# Patient Record
Sex: FEMALE | Race: BLACK OR AFRICAN AMERICAN | Employment: UNEMPLOYED | URBAN - METROPOLITAN AREA
[De-identification: names, ages, dates, MRNs, and addresses within clinical notes are randomized per-mention and may not be internally consistent; named-entity substitution may affect disease eponyms.]

---

## 2020-03-12 ENCOUNTER — HOSPITAL ENCOUNTER (INPATIENT)
Age: 40
LOS: 6 days | Discharge: NURSING, REHAB OR LONG-TERM CARE | DRG: 383 | End: 2020-03-18
Attending: EMERGENCY MEDICINE | Admitting: INTERNAL MEDICINE
Payer: MEDICAID

## 2020-03-12 ENCOUNTER — APPOINTMENT (OUTPATIENT)
Dept: CT IMAGING | Age: 40
DRG: 383 | End: 2020-03-12
Attending: EMERGENCY MEDICINE
Payer: MEDICAID

## 2020-03-12 ENCOUNTER — APPOINTMENT (OUTPATIENT)
Dept: GENERAL RADIOLOGY | Age: 40
DRG: 383 | End: 2020-03-12
Attending: EMERGENCY MEDICINE
Payer: MEDICAID

## 2020-03-12 DIAGNOSIS — R09.02 HYPOXIA: ICD-10-CM

## 2020-03-12 DIAGNOSIS — K61.1 PERIRECTAL ABSCESS: Primary | ICD-10-CM

## 2020-03-12 PROBLEM — R73.9 HYPERGLYCEMIA: Status: ACTIVE | Noted: 2020-03-12

## 2020-03-12 PROBLEM — R53.1 WEAKNESS: Status: ACTIVE | Noted: 2020-03-12

## 2020-03-12 PROBLEM — K61.0 PERIANAL CELLULITIS: Status: ACTIVE | Noted: 2020-03-12

## 2020-03-12 LAB
ALBUMIN SERPL-MCNC: 2.3 G/DL (ref 3.5–5)
ALBUMIN/GLOB SERPL: 0.4 {RATIO} (ref 1.1–2.2)
ALP SERPL-CCNC: 101 U/L (ref 45–117)
ALT SERPL-CCNC: 25 U/L (ref 12–78)
AMPHET UR QL SCN: NEGATIVE
ANION GAP SERPL CALC-SCNC: 6 MMOL/L (ref 5–15)
APPEARANCE UR: CLEAR
AST SERPL-CCNC: 45 U/L (ref 15–37)
BACTERIA URNS QL MICRO: NEGATIVE /HPF
BARBITURATES UR QL SCN: NEGATIVE
BASOPHILS # BLD: 0 K/UL (ref 0–0.1)
BASOPHILS NFR BLD: 0 % (ref 0–1)
BENZODIAZ UR QL: NEGATIVE
BILIRUB SERPL-MCNC: 0.5 MG/DL (ref 0.2–1)
BILIRUB UR QL: NEGATIVE
BUN SERPL-MCNC: 10 MG/DL (ref 6–20)
BUN/CREAT SERPL: 11 (ref 12–20)
CALCIUM SERPL-MCNC: 8.5 MG/DL (ref 8.5–10.1)
CANNABINOIDS UR QL SCN: POSITIVE
CHLORIDE SERPL-SCNC: 96 MMOL/L (ref 97–108)
CO2 SERPL-SCNC: 28 MMOL/L (ref 21–32)
COCAINE UR QL SCN: NEGATIVE
COLOR UR: ABNORMAL
COMMENT, HOLDF: NORMAL
CREAT SERPL-MCNC: 0.93 MG/DL (ref 0.55–1.02)
DIFFERENTIAL METHOD BLD: ABNORMAL
DRUG SCRN COMMENT,DRGCM: ABNORMAL
EOSINOPHIL # BLD: 0.1 K/UL (ref 0–0.4)
EOSINOPHIL NFR BLD: 1 % (ref 0–7)
EPITH CASTS URNS QL MICRO: ABNORMAL /LPF
ERYTHROCYTE [DISTWIDTH] IN BLOOD BY AUTOMATED COUNT: 16.6 % (ref 11.5–14.5)
EST. AVERAGE GLUCOSE BLD GHB EST-MCNC: 275 MG/DL
GLOBULIN SER CALC-MCNC: 5.7 G/DL (ref 2–4)
GLUCOSE BLD STRIP.AUTO-MCNC: 154 MG/DL (ref 65–100)
GLUCOSE BLD STRIP.AUTO-MCNC: 166 MG/DL (ref 65–100)
GLUCOSE BLD STRIP.AUTO-MCNC: 377 MG/DL (ref 65–100)
GLUCOSE BLD STRIP.AUTO-MCNC: 395 MG/DL (ref 65–100)
GLUCOSE BLD STRIP.AUTO-MCNC: 408 MG/DL (ref 65–100)
GLUCOSE BLD STRIP.AUTO-MCNC: 414 MG/DL (ref 65–100)
GLUCOSE SERPL-MCNC: 393 MG/DL (ref 65–100)
GLUCOSE UR STRIP.AUTO-MCNC: >1000 MG/DL
HBA1C MFR BLD: 11.2 % (ref 4–5.6)
HCG UR QL: NEGATIVE
HCT VFR BLD AUTO: 31.9 % (ref 35–47)
HGB BLD-MCNC: 10.1 G/DL (ref 11.5–16)
HGB UR QL STRIP: ABNORMAL
HIV 1+2 AB+HIV1 P24 AG SERPL QL IA: NONREACTIVE
HIV12 RESULT COMMENT, HHIVC: NORMAL
IMM GRANULOCYTES # BLD AUTO: 0.1 K/UL (ref 0–0.04)
IMM GRANULOCYTES NFR BLD AUTO: 1 % (ref 0–0.5)
KETONES UR QL STRIP.AUTO: NEGATIVE MG/DL
LACTATE SERPL-SCNC: 1.4 MMOL/L (ref 0.4–2)
LEUKOCYTE ESTERASE UR QL STRIP.AUTO: NEGATIVE
LYMPHOCYTES # BLD: 2.7 K/UL (ref 0.8–3.5)
LYMPHOCYTES NFR BLD: 29 % (ref 12–49)
MAGNESIUM SERPL-MCNC: 2.7 MG/DL (ref 1.6–2.4)
MCH RBC QN AUTO: 26.1 PG (ref 26–34)
MCHC RBC AUTO-ENTMCNC: 31.7 G/DL (ref 30–36.5)
MCV RBC AUTO: 82.4 FL (ref 80–99)
METHADONE UR QL: NEGATIVE
MONOCYTES # BLD: 0.5 K/UL (ref 0–1)
MONOCYTES NFR BLD: 6 % (ref 5–13)
NEUTS SEG # BLD: 6.1 K/UL (ref 1.8–8)
NEUTS SEG NFR BLD: 63 % (ref 32–75)
NITRITE UR QL STRIP.AUTO: NEGATIVE
NRBC # BLD: 0 K/UL (ref 0–0.01)
NRBC BLD-RTO: 0 PER 100 WBC
OPIATES UR QL: NEGATIVE
PCP UR QL: NEGATIVE
PH UR STRIP: 6 [PH] (ref 5–8)
PHOSPHATE SERPL-MCNC: 2.9 MG/DL (ref 2.6–4.7)
PLATELET # BLD AUTO: 300 K/UL (ref 150–400)
PMV BLD AUTO: 11.4 FL (ref 8.9–12.9)
POTASSIUM SERPL-SCNC: 4.6 MMOL/L (ref 3.5–5.1)
PROT SERPL-MCNC: 8 G/DL (ref 6.4–8.2)
PROT UR STRIP-MCNC: ABNORMAL MG/DL
RBC # BLD AUTO: 3.87 M/UL (ref 3.8–5.2)
RBC #/AREA URNS HPF: >100 /HPF (ref 0–5)
SAMPLES BEING HELD,HOLD: NORMAL
SERVICE CMNT-IMP: ABNORMAL
SODIUM SERPL-SCNC: 130 MMOL/L (ref 136–145)
SP GR UR REFRACTOMETRY: <1.005
UR CULT HOLD, URHOLD: NORMAL
UROBILINOGEN UR QL STRIP.AUTO: 1 EU/DL (ref 0.2–1)
WBC # BLD AUTO: 9.5 K/UL (ref 3.6–11)
WBC URNS QL MICRO: ABNORMAL /HPF (ref 0–4)

## 2020-03-12 PROCEDURE — 74011000258 HC RX REV CODE- 258: Performed by: INTERNAL MEDICINE

## 2020-03-12 PROCEDURE — 83036 HEMOGLOBIN GLYCOSYLATED A1C: CPT

## 2020-03-12 PROCEDURE — 82962 GLUCOSE BLOOD TEST: CPT

## 2020-03-12 PROCEDURE — 96365 THER/PROPH/DIAG IV INF INIT: CPT

## 2020-03-12 PROCEDURE — 74011250636 HC RX REV CODE- 250/636: Performed by: INTERNAL MEDICINE

## 2020-03-12 PROCEDURE — 87389 HIV-1 AG W/HIV-1&-2 AB AG IA: CPT

## 2020-03-12 PROCEDURE — 99285 EMERGENCY DEPT VISIT HI MDM: CPT

## 2020-03-12 PROCEDURE — 74011636637 HC RX REV CODE- 636/637: Performed by: INTERNAL MEDICINE

## 2020-03-12 PROCEDURE — 36415 COLL VENOUS BLD VENIPUNCTURE: CPT

## 2020-03-12 PROCEDURE — 83735 ASSAY OF MAGNESIUM: CPT

## 2020-03-12 PROCEDURE — 74011000258 HC RX REV CODE- 258: Performed by: EMERGENCY MEDICINE

## 2020-03-12 PROCEDURE — 96361 HYDRATE IV INFUSION ADD-ON: CPT

## 2020-03-12 PROCEDURE — 81025 URINE PREGNANCY TEST: CPT

## 2020-03-12 PROCEDURE — 74177 CT ABD & PELVIS W/CONTRAST: CPT

## 2020-03-12 PROCEDURE — 74011250637 HC RX REV CODE- 250/637: Performed by: INTERNAL MEDICINE

## 2020-03-12 PROCEDURE — 65270000032 HC RM SEMIPRIVATE

## 2020-03-12 PROCEDURE — 85025 COMPLETE CBC W/AUTO DIFF WBC: CPT

## 2020-03-12 PROCEDURE — 74011000258 HC RX REV CODE- 258: Performed by: RADIOLOGY

## 2020-03-12 PROCEDURE — 74011250636 HC RX REV CODE- 250/636: Performed by: EMERGENCY MEDICINE

## 2020-03-12 PROCEDURE — 81001 URINALYSIS AUTO W/SCOPE: CPT

## 2020-03-12 PROCEDURE — 83605 ASSAY OF LACTIC ACID: CPT

## 2020-03-12 PROCEDURE — 80307 DRUG TEST PRSMV CHEM ANLYZR: CPT

## 2020-03-12 PROCEDURE — 74011636320 HC RX REV CODE- 636/320: Performed by: RADIOLOGY

## 2020-03-12 PROCEDURE — 70450 CT HEAD/BRAIN W/O DYE: CPT

## 2020-03-12 PROCEDURE — 87040 BLOOD CULTURE FOR BACTERIA: CPT

## 2020-03-12 PROCEDURE — 77030019905 HC CATH URETH INTMIT MDII -A

## 2020-03-12 PROCEDURE — 84100 ASSAY OF PHOSPHORUS: CPT

## 2020-03-12 PROCEDURE — 80053 COMPREHEN METABOLIC PANEL: CPT

## 2020-03-12 PROCEDURE — 71045 X-RAY EXAM CHEST 1 VIEW: CPT

## 2020-03-12 RX ORDER — IBUPROFEN 200 MG
1 TABLET ORAL EVERY 24 HOURS
Status: DISCONTINUED | OUTPATIENT
Start: 2020-03-12 | End: 2020-03-18 | Stop reason: HOSPADM

## 2020-03-12 RX ORDER — INSULIN LISPRO 100 [IU]/ML
INJECTION, SOLUTION INTRAVENOUS; SUBCUTANEOUS
Status: DISCONTINUED | OUTPATIENT
Start: 2020-03-12 | End: 2020-03-18 | Stop reason: HOSPADM

## 2020-03-12 RX ORDER — SODIUM CHLORIDE 0.9 % (FLUSH) 0.9 %
5-40 SYRINGE (ML) INJECTION EVERY 8 HOURS
Status: DISCONTINUED | OUTPATIENT
Start: 2020-03-12 | End: 2020-03-13

## 2020-03-12 RX ORDER — MAGNESIUM SULFATE 100 %
4 CRYSTALS MISCELLANEOUS AS NEEDED
Status: DISCONTINUED | OUTPATIENT
Start: 2020-03-12 | End: 2020-03-12 | Stop reason: SDUPTHER

## 2020-03-12 RX ORDER — SODIUM CHLORIDE 0.9 % (FLUSH) 0.9 %
10 SYRINGE (ML) INJECTION
Status: COMPLETED | OUTPATIENT
Start: 2020-03-12 | End: 2020-03-12

## 2020-03-12 RX ORDER — DEXTROSE MONOHYDRATE 100 MG/ML
0-250 INJECTION, SOLUTION INTRAVENOUS AS NEEDED
Status: DISCONTINUED | OUTPATIENT
Start: 2020-03-12 | End: 2020-03-12 | Stop reason: SDUPTHER

## 2020-03-12 RX ORDER — ENOXAPARIN SODIUM 100 MG/ML
40 INJECTION SUBCUTANEOUS EVERY 24 HOURS
Status: DISCONTINUED | OUTPATIENT
Start: 2020-03-12 | End: 2020-03-18 | Stop reason: HOSPADM

## 2020-03-12 RX ORDER — SODIUM CHLORIDE 9 MG/ML
125 INJECTION, SOLUTION INTRAVENOUS CONTINUOUS
Status: DISCONTINUED | OUTPATIENT
Start: 2020-03-12 | End: 2020-03-14

## 2020-03-12 RX ORDER — CITALOPRAM 20 MG/1
20 TABLET, FILM COATED ORAL DAILY
Status: ON HOLD | COMMUNITY
End: 2020-03-16 | Stop reason: SDUPTHER

## 2020-03-12 RX ORDER — SODIUM CHLORIDE 9 MG/ML
500 INJECTION, SOLUTION INTRAVENOUS CONTINUOUS
Status: DISPENSED | OUTPATIENT
Start: 2020-03-12 | End: 2020-03-12

## 2020-03-12 RX ORDER — ALPRAZOLAM 0.5 MG/1
0.5 TABLET ORAL
Status: ON HOLD | COMMUNITY
End: 2020-03-16 | Stop reason: SDUPTHER

## 2020-03-12 RX ORDER — INSULIN LISPRO 100 [IU]/ML
4 INJECTION, SOLUTION INTRAVENOUS; SUBCUTANEOUS
Status: DISCONTINUED | OUTPATIENT
Start: 2020-03-12 | End: 2020-03-18 | Stop reason: HOSPADM

## 2020-03-12 RX ORDER — ALPRAZOLAM 0.25 MG/1
0.25 TABLET ORAL 2 TIMES DAILY
Status: DISCONTINUED | OUTPATIENT
Start: 2020-03-12 | End: 2020-03-12

## 2020-03-12 RX ORDER — SACUBITRIL AND VALSARTAN 24; 26 MG/1; MG/1
1 TABLET, FILM COATED ORAL 2 TIMES DAILY
Status: ON HOLD | COMMUNITY
End: 2020-03-16 | Stop reason: SDUPTHER

## 2020-03-12 RX ORDER — MAGNESIUM SULFATE 100 %
4 CRYSTALS MISCELLANEOUS AS NEEDED
Status: DISCONTINUED | OUTPATIENT
Start: 2020-03-12 | End: 2020-03-18 | Stop reason: HOSPADM

## 2020-03-12 RX ORDER — SODIUM CHLORIDE 0.9 % (FLUSH) 0.9 %
5-40 SYRINGE (ML) INJECTION AS NEEDED
Status: DISCONTINUED | OUTPATIENT
Start: 2020-03-12 | End: 2020-03-18 | Stop reason: HOSPADM

## 2020-03-12 RX ORDER — IBUPROFEN 400 MG/1
400 TABLET ORAL
Status: DISCONTINUED | OUTPATIENT
Start: 2020-03-12 | End: 2020-03-18 | Stop reason: HOSPADM

## 2020-03-12 RX ORDER — INSULIN LISPRO 100 [IU]/ML
0.05 INJECTION, SOLUTION INTRAVENOUS; SUBCUTANEOUS
Status: DISCONTINUED | OUTPATIENT
Start: 2020-03-12 | End: 2020-03-12

## 2020-03-12 RX ORDER — ACETAMINOPHEN 325 MG/1
650 TABLET ORAL
Status: DISCONTINUED | OUTPATIENT
Start: 2020-03-12 | End: 2020-03-18 | Stop reason: HOSPADM

## 2020-03-12 RX ORDER — METOPROLOL SUCCINATE 50 MG/1
50 TABLET, EXTENDED RELEASE ORAL DAILY
Status: ON HOLD | COMMUNITY
End: 2020-03-16 | Stop reason: SDUPTHER

## 2020-03-12 RX ORDER — INSULIN LISPRO 100 [IU]/ML
8 INJECTION, SOLUTION INTRAVENOUS; SUBCUTANEOUS ONCE
Status: COMPLETED | OUTPATIENT
Start: 2020-03-12 | End: 2020-03-12

## 2020-03-12 RX ORDER — ONDANSETRON 2 MG/ML
4 INJECTION INTRAMUSCULAR; INTRAVENOUS
Status: DISCONTINUED | OUTPATIENT
Start: 2020-03-12 | End: 2020-03-18 | Stop reason: HOSPADM

## 2020-03-12 RX ORDER — INSULIN GLARGINE 100 [IU]/ML
0.2 INJECTION, SOLUTION SUBCUTANEOUS
Status: DISCONTINUED | OUTPATIENT
Start: 2020-03-12 | End: 2020-03-18 | Stop reason: HOSPADM

## 2020-03-12 RX ORDER — PANTOPRAZOLE SODIUM 40 MG/1
40 TABLET, DELAYED RELEASE ORAL
Status: DISCONTINUED | OUTPATIENT
Start: 2020-03-12 | End: 2020-03-12

## 2020-03-12 RX ORDER — INSULIN LISPRO 100 [IU]/ML
6 INJECTION, SOLUTION INTRAVENOUS; SUBCUTANEOUS ONCE
Status: COMPLETED | OUTPATIENT
Start: 2020-03-12 | End: 2020-03-12

## 2020-03-12 RX ORDER — ALPRAZOLAM 0.25 MG/1
0.25 TABLET ORAL 2 TIMES DAILY
Status: DISCONTINUED | OUTPATIENT
Start: 2020-03-12 | End: 2020-03-14

## 2020-03-12 RX ORDER — DEXTROSE MONOHYDRATE 100 MG/ML
0-250 INJECTION, SOLUTION INTRAVENOUS AS NEEDED
Status: DISCONTINUED | OUTPATIENT
Start: 2020-03-12 | End: 2020-03-18 | Stop reason: HOSPADM

## 2020-03-12 RX ORDER — ERGOCALCIFEROL 1.25 MG/1
50000 CAPSULE ORAL
Status: ON HOLD | COMMUNITY
End: 2020-03-16 | Stop reason: SDUPTHER

## 2020-03-12 RX ORDER — INSULIN LISPRO 100 [IU]/ML
INJECTION, SOLUTION INTRAVENOUS; SUBCUTANEOUS
Status: DISCONTINUED | OUTPATIENT
Start: 2020-03-12 | End: 2020-03-12

## 2020-03-12 RX ORDER — PANTOPRAZOLE SODIUM 40 MG/1
40 TABLET, DELAYED RELEASE ORAL
Status: DISCONTINUED | OUTPATIENT
Start: 2020-03-13 | End: 2020-03-18 | Stop reason: HOSPADM

## 2020-03-12 RX ORDER — SODIUM CHLORIDE 0.9 % (FLUSH) 0.9 %
5-40 SYRINGE (ML) INJECTION EVERY 8 HOURS
Status: DISCONTINUED | OUTPATIENT
Start: 2020-03-12 | End: 2020-03-18 | Stop reason: HOSPADM

## 2020-03-12 RX ADMIN — PIPERACILLIN AND TAZOBACTAM 3.38 G: 3; .375 INJECTION, POWDER, LYOPHILIZED, FOR SOLUTION INTRAVENOUS at 05:22

## 2020-03-12 RX ADMIN — SODIUM CHLORIDE 1000 ML: 900 INJECTION, SOLUTION INTRAVENOUS at 03:24

## 2020-03-12 RX ADMIN — INSULIN GLARGINE 19 UNITS: 100 INJECTION, SOLUTION SUBCUTANEOUS at 22:41

## 2020-03-12 RX ADMIN — Medication 10 ML: at 12:38

## 2020-03-12 RX ADMIN — INSULIN LISPRO 6 UNITS: 100 INJECTION, SOLUTION INTRAVENOUS; SUBCUTANEOUS at 06:38

## 2020-03-12 RX ADMIN — Medication 10 ML: at 04:26

## 2020-03-12 RX ADMIN — ALPRAZOLAM 0.25 MG: 0.25 TABLET ORAL at 15:25

## 2020-03-12 RX ADMIN — Medication 10 ML: at 22:45

## 2020-03-12 RX ADMIN — SODIUM CHLORIDE 100 ML: 900 INJECTION, SOLUTION INTRAVENOUS at 04:26

## 2020-03-12 RX ADMIN — INSULIN LISPRO 4 UNITS: 100 INJECTION, SOLUTION INTRAVENOUS; SUBCUTANEOUS at 14:13

## 2020-03-12 RX ADMIN — SODIUM CHLORIDE 3.38 G: 900 INJECTION, SOLUTION INTRAVENOUS at 22:41

## 2020-03-12 RX ADMIN — IBUPROFEN 400 MG: 400 TABLET, FILM COATED ORAL at 12:37

## 2020-03-12 RX ADMIN — INSULIN LISPRO 8 UNITS: 100 INJECTION, SOLUTION INTRAVENOUS; SUBCUTANEOUS at 14:12

## 2020-03-12 RX ADMIN — Medication 10 ML: at 15:25

## 2020-03-12 RX ADMIN — INSULIN LISPRO 2 UNITS: 100 INJECTION, SOLUTION INTRAVENOUS; SUBCUTANEOUS at 17:14

## 2020-03-12 RX ADMIN — ONDANSETRON 4 MG: 2 INJECTION, SOLUTION INTRAMUSCULAR; INTRAVENOUS at 13:14

## 2020-03-12 RX ADMIN — INSULIN LISPRO 4 UNITS: 100 INJECTION, SOLUTION INTRAVENOUS; SUBCUTANEOUS at 17:13

## 2020-03-12 RX ADMIN — SODIUM CHLORIDE 125 ML/HR: 900 INJECTION, SOLUTION INTRAVENOUS at 22:45

## 2020-03-12 RX ADMIN — ENOXAPARIN SODIUM 40 MG: 40 INJECTION SUBCUTANEOUS at 09:03

## 2020-03-12 RX ADMIN — SODIUM CHLORIDE 125 ML/HR: 900 INJECTION, SOLUTION INTRAVENOUS at 09:16

## 2020-03-12 RX ADMIN — SODIUM CHLORIDE 1000 ML: 900 INJECTION, SOLUTION INTRAVENOUS at 05:38

## 2020-03-12 RX ADMIN — PANTOPRAZOLE SODIUM 40 MG: 40 TABLET, DELAYED RELEASE ORAL at 12:37

## 2020-03-12 RX ADMIN — IOPAMIDOL 100 ML: 755 INJECTION, SOLUTION INTRAVENOUS at 04:26

## 2020-03-12 RX ADMIN — SODIUM CHLORIDE 3.38 G: 900 INJECTION, SOLUTION INTRAVENOUS at 12:37

## 2020-03-12 RX ADMIN — IBUPROFEN 400 MG: 400 TABLET, FILM COATED ORAL at 18:45

## 2020-03-12 RX ADMIN — HUMAN INSULIN 15 UNITS: 100 INJECTION, SUSPENSION SUBCUTANEOUS at 09:02

## 2020-03-12 NOTE — ED TRIAGE NOTES
[facility] after being found wandering around the shelter. Pt reported feeling tired and weak. Was able to ambulate from stretcher to ED stretcher. Pt lethargic with slurred speech during triage     Was seen at [facility] for an abscess on buttocks yesterday. Had an elevated BG at [facility] of 507 yesterday as well and, per pt, was treated with insulin.          Hx of DM

## 2020-03-12 NOTE — PROGRESS NOTES
Admission Medication Reconciliation:    Information obtained from:  [address]:  YES    Comments/Recommendations: Updated PTA meds/reviewed patient's allergies. 1)  Added all of the medications listed below. No medications present prior to review. ¹[employer] pharmacy benefit data reflects medications filled and processed through the patient's insurance, however   this data does NOT capture whether the medication was picked up or is currently being taken by the patient. Allergies:  Patient has no known allergies. Significant PMH/Disease States:   Past Medical History:   Diagnosis Date    Diabetes [facility])      Chief Complaint for this Admission:    Chief Complaint   Patient presents with    Blood sugar problem    Lethargy     Prior to Admission Medications:   Prior to Admission Medications   Prescriptions Last Dose Informant Taking? ALPRAZolam (XANAX) 0.5 mg tablet   Yes   Sig: Take 0.5 mg by mouth three (3) times daily as needed for Anxiety. SITagliptin-metFORMIN (JANUMET) 50-1,000 mg per tablet   Yes   Sig: Take 1 Tab by mouth two (2) times daily (with meals). citalopram (CELEXA) 20 mg tablet   Yes   Sig: Take 20 mg by mouth daily. ergocalciferol (ERGOCALCIFEROL) 1,250 mcg (50,000 unit) capsule   Yes   Sig: Take 50,000 Units by mouth every seven (7) days. metoprolol succinate (TOPROL-XL) 50 mg XL tablet   Yes   Sig: Take 50 mg by mouth daily. sacubitriL-valsartan (Entresto) 24-26 mg tablet   Yes   Sig: Take 1 Tab by mouth two (2) times a day. Facility-Administered Medications: None       Please contact the main inpatient pharmacy with any questions or concerns at [phone] and we will direct you to the clinical pharmacist covering this patient's care while in-house.    Evelyne Schaeffer

## 2020-03-12 NOTE — PROGRESS NOTES
Bedside shift change report given to Rosalina Kate (oncoming nurse) by Nathan (offgoing nurse). Report included the following information SBAR, Kardex, MAR and Recent Results.

## 2020-03-12 NOTE — WOUND CARE
WOCN Note:  
 
New consult placed for assessment of right and left buttock abscess sites that were I&Ded at [facility] recently. Patient was discharged from [facility] on 3.11.2020. Chart reviewed. Admitted DX:  Hypoxia; Perianal cellulitis; Hyperglycemia; Weakness Past Medical History:  DM; legally blind in Right eye; homeless Assessment:  
Patient is A&O x 3, communicative and mobile independently in bed. Bed: aftab Patient wearing briefs for incontinence/menses. Patient reports pain to the buttocks. Heels intact without erythema. Sacrum intact without erythema. 1. POA Left buttock, s/p I&D abscess site: 0.5 x 2 x 4.5 cm;  Unable to see depth of wound; there is yellow slough coming out of wound; moderate amount of purulent drainage; no odor; induration and red tenderness to the periwound. 2.  POA Right buttock, s/p I&D abscess site:  0.7 x 1.3 x 3.5 cm; unable to see depth of wound; the area that is visible is pink; small purulent drainage; no odor; induration and red tenderness to the periwound. Wound, Pressure Prevention & Skin Care Recommendations: 1. Minimize layers of linen/pads under patient to optimize support surface. 2.  Turn/reposition approximately every 2 hours and offload heels. 3.   Right and left buttocks:  Pack with 1 continuous length of half width [place]; ensure that an ample amount is outside of the wound; cover with dry gauze and secure with tape. Discussed above plan with patient, Dr Chau Albert and Veronique Albright RN. Transition of Care: Plan to follow as needed while admitted to hospital.  Meeting Dr Chau Albert tomorrow to see patient and reassess wound. WING [place] RN [facility] Inpatient Wound Care Available on [facility] Pager [phone] Office [phone]

## 2020-03-12 NOTE — PROGRESS NOTES
0830 TRANSFER - IN REPORT:    Verbal report received from trenton(name) on Anna Morales  being received from ED(unit) for routine progression of care      Report consisted of patients Situation, Background, Assessment and   Recommendations(SBAR). Information from the following report(s) SBAR, Kardex and MAR was reviewed with the receiving nurse. Opportunity for questions and clarification was provided. Assessment completed upon patients arrival to unit and care assumed. Pt appears to be under the influence, MD noted in ER    1300 Pt attempted to being given xanex from home by male visitor.  Retrieved, now lock in drawer

## 2020-03-12 NOTE — PROGRESS NOTES
Problem: Diabetes Self-Management  Goal: *Disease process and treatment process  Description: Define diabetes and identify own type of diabetes; list 3 options for treating diabetes. Outcome: Progressing Towards Goal  Goal: *Incorporating nutritional management into lifestyle  Description: Describe effect of type, amount and timing of food on blood glucose; list 3 methods for planning meals. Outcome: Not Progressing Towards Goal  Goal: *Incorporating physical activity into lifestyle  Description: State effect of exercise on blood glucose levels. Outcome: Not Progressing Towards Goal  Goal: *Developing strategies to promote health/change behavior  Description: Define the ABC's of diabetes; identify appropriate screenings, schedule and personal plan for screenings. Outcome: Not Progressing Towards Goal  Goal: *Using medications safely  Description: State effect of diabetes medications on diabetes; name diabetes medication taking, action and side effects. Outcome: Not Progressing Towards Goal  Goal: *Monitoring blood glucose, interpreting and using results  Description: Identify recommended blood glucose targets  and personal targets. Outcome: Progressing Towards Goal  Goal: *Prevention, detection, treatment of acute complications  Description: List symptoms of hyper- and hypoglycemia; describe how to treat low blood sugar and actions for lowering  high blood glucose level. Outcome: Not Progressing Towards Goal  Goal: *Prevention, detection and treatment of chronic complications  Description: Define the natural course of diabetes and describe the relationship of blood glucose levels to long term complications of diabetes.   Outcome: Not Progressing Towards Goal  Goal: *Developing strategies to address psychosocial issues  Description: Describe feelings about living with diabetes; identify support needed and support network  Outcome: Not Progressing Towards Goal     Problem: Falls - Risk of  Goal: *Absence of Falls  Description: Document Dilshad Walter Fall Risk and appropriate interventions in the flowsheet.   Outcome: Progressing Towards Goal  Note: Fall Risk Interventions:  Mobility Interventions: Patient to call before getting OOB, PT Consult for assist device competence, Bed/chair exit alarm    Mentation Interventions: Adequate sleep, hydration, pain control, Bed/chair exit alarm    Medication Interventions: Bed/chair exit alarm, Patient to call before getting OOB    Elimination Interventions: Call light in reach, Bed/chair exit alarm, Toileting schedule/hourly rounds

## 2020-03-12 NOTE — CONSULTS
[facility]  CLINICAL NURSE SPECIALIST CONSULT  PROGRAM FOR DIABETES HEALTH    Presentation   Eli Alfreod is a 44 y.o. female admitted with perirectal abscess and hyperglycemia. Current clinical course has been uncomplicated. Patient has known type two diabetes. Consulted by Provider for advanced diabetes nursing assessment and care, specifically related to     [x] Inpatient management strategy  [x] Home management assessment      Diabetes-related medical history  Acute complications: Hyperglycemia    Neurological complications  Peripheral neuropathy     Microvascular disease: None    Macrovascular disease  Foot wounds     Other associated conditions     Depression    Diabetes medication history  Drug class Currently in use Discontinued Never used   Biguanide      DDP-4 inhibitor       Sulfonylurea      Thiazolidinedione      GLP-1 RA      SGLT-2 inhibitors      Basal insulin      Bolus insulin        Subjective   Ms Diogenes Crespo is a 44year old female with a history of uncontrolled type two diabetes diabetes who presented to the ED last night for c/o weakness, lethargy and incontinence. Of note, she was discharged from [facility] one day prior after I&D for perineal abscesses, incisions on both buttocks, draining pus. In the [facility] ED, she was noted to have an altered mental status with a blood glucose in the 400s and an A1C of 11.2%. She reports that she was evicted about 6 months ago and has been living in a [facility]. Discussion of diabetes management was difficult as she continues to have an altered mental status and nods off in the middle of conversation. She did report that she gets metformin for free and was given two Toujeo pens on discharge home.     Social determinants of health impacting diabetes self-management practices   Worried that housing situation is unstable, Worried that your food supply will run out before you have money to buy more, Missing health appointments or obtaining medications due to lack of reliable transportation and Struggling with anxiety and/or depression    Objective   Physical exam  General Altered mental status, falling asleep during exam.  Vital Signs   Visit Vitals  /70 (BP 1 Location: Right arm, BP Patient Position: At rest)   Pulse 93   Temp 96.9 °F (36.1 °C)   Resp 14   Ht 5' 6\" (1.676 m)   Wt 93.7 kg (206 lb 9.1 oz)   SpO2 97%   BMI 33.34 kg/m²   . Skin  Warm and dry. Acanthosis noted along neckline. No lipohypertrophy or lipoatrophy noted at injection sites   Heart   Regular rate and rhythm. No murmurs, rubs or gallops  Lungs  Clear without rales or rhonchi  Extremities Diabetic foot exam:    Left Foot     Visual Exam: normal    Pulse DP: 2+ (normal)   Vibratory and Filament test: deferred due to clinical condition. Patient falling asleep during exam    Right Foot   Visual Exam: normal    Pulse DP: 2+ (normal)   Vibratory and Filament test: deferred due to clinical condition. Falling asleep      Laboratory  Lab Results   Component Value Date/Time    Hemoglobin A1c 11.2 (H) 03/12/2020 06:39 AM     No results found for: LDL, LDLC, DLDLP  Lab Results   Component Value Date/Time    Creatinine 0.93 03/12/2020 03:05 AM     Lab Results   Component Value Date/Time    Sodium 130 (L) 03/12/2020 03:05 AM    Potassium 4.6 03/12/2020 03:05 AM    Chloride 96 (L) 03/12/2020 03:05 AM    CO2 28 03/12/2020 03:05 AM    Anion gap 6 03/12/2020 03:05 AM    Glucose 393 (H) 03/12/2020 03:05 AM    BUN 10 03/12/2020 03:05 AM    Creatinine 0.93 03/12/2020 03:05 AM    BUN/Creatinine ratio 11 (L) 03/12/2020 03:05 AM    GFR est AA >60 03/12/2020 03:05 AM    GFR est non-AA >60 03/12/2020 03:05 AM    Calcium 8.5 03/12/2020 03:05 AM    Bilirubin, total 0.5 03/12/2020 03:05 AM    AST (SGOT) 45 (H) 03/12/2020 03:05 AM    Alk.  phosphatase 101 03/12/2020 03:05 AM    Protein, total 8.0 03/12/2020 03:05 AM    Albumin 2.3 (L) 03/12/2020 03:05 AM    Globulin 5.7 (H) 03/12/2020 03:05 AM    A-G Ratio 0.4 (L) 03/12/2020 03:05 AM    ALT (SGPT) 25 03/12/2020 03:05 AM     Lab Results   Component Value Date/Time    ALT (SGPT) 25 03/12/2020 03:05 AM       Blood glucose pattern  -414      Assessment and Plan   Nursing Diagnosis Risk for unstable blood glucose pattern   Nursing Intervention Domain 2284 Decision-making Support   Nursing Interventions Examined current inpatient diabetes control   Explored factors facilitating and impeding inpatient management  Identified self-management practices impeding diabetes control  Explored corrective strategies with patient and responsible inpatient provider   Informed patient of rational for insulin strategy while hospitalized     Evaluation   Ms Nora Callaway is a 44year old female with a history of uncontrolled type 1 diabetes who presented to the ED with a new c/o weakness, lethargy and incontinence. She was discharged 1 day ago from [facility] after an I&D of bilateral perirectal abcess with initiation of PO antibiotics. Blood glucose elevated on admission to 414. She reports an unstable living situation and inconsistency in medication administration. She verbalizes that she has access to metformin but I suspect that she doesn't take this consistently. She has a prescription for insulin but does not have a consistent refrigerator to keep unused insulin in. Insulin to be given in the inpatient setting to address hyperglycemia. Case management and team to come up with a plan for diabetes management. Insulin may not be the best choice as patient has proven that she doesn't have appropriate resources for storage at this time. Recommendations   Recommend:  Initiate the subcutaneous insulin orderset:  1. Low Dose Lantus: 20 units Daily; first dose this evening  Daily evaluation fasting blood glucose will be required to determine the appropriate amount of Lantus.  If fasting blood glucose is over 200, please add the total amount of correctional Humalog received the day before to total Lantus dose. Ok to split Lantus BID for larger doses. 2. Low dose bolus with meals: (0.05 units/kg): 5 units Humalog with meals  If pre-prandial glucose remains over 200, please adjust Humalog to 8 units with meals. 3. Correctional insulin for obese/resistant patients    4. Diabetic Diet    5. Case Management     On discharge: Continue metformin 1000 mg BID  While insulin is appropriate for her diabetes; she does not have resources to properly store. I also am not confident in her mental capability and skill to administer.   Please consider consider switching to a cost effective pill such as Amaryl 2 mg Daily           Billing Code(s)     [x] 10792 IP subsequent hospital care - 39 minutes      ROSHNI Boyer  Access via RAINA [place]ales [phone]

## 2020-03-12 NOTE — ED PROVIDER NOTES
HPI     66-year-old female who apparently is homeless, insulin-dependent diabetes, Behcet's disease, asthma, presents the emergency department with generalized weakness. She states she could not get up to go to the bathroom tonight and urinated on herself. She states she felt off balance. She denies a headache or hitting her head, or loss of consciousness. She denies any focal weakness or numbness. She states she was at [facility] yesterday for perirectal abscesses which were drained. She is on antibiotics but she is not sure which. She also reports a cough in the past couple of days. She denies a fever, chest pain, shortness of breath or abdominal pain, nausea, vomiting, or diarrhea. She states she is urinating normally. She has not had increased thirst.  She does smoke. Denies alcohol or substance abuse. Past Medical History:   Diagnosis Date    Diabetes [facility])        History reviewed. No pertinent surgical history. History reviewed. No pertinent family history.     Social History     Socioeconomic History    Marital status: Not on file     Spouse name: Not on file    Number of children: Not on file    Years of education: Not on file    Highest education level: Not on file   Occupational History    Not on file   Social Needs    Financial resource strain: Not on file    Food insecurity     Worry: Not on file     Inability: Not on file    Transportation needs     Medical: Not on file     Non-medical: Not on file   Tobacco Use    Smoking status: Current Every Day Smoker     Packs/day: 1.00    Smokeless tobacco: Never Used   Substance and Sexual Activity    Alcohol use: Never     Frequency: Never    Drug use: Never    Sexual activity: Not on file   Lifestyle    Physical activity     Days per week: Not on file     Minutes per session: Not on file    Stress: Not on file   Relationships    Social connections     Talks on phone: Not on file     Gets together: Not on file     Attends Judaism service: Not on file     Active member of club or organization: Not on file     Attends meetings of clubs or organizations: Not on file     Relationship status: Not on file    Intimate partner violence     Fear of current or ex partner: Not on file     Emotionally abused: Not on file     Physically abused: Not on file     Forced sexual activity: Not on file   Other Topics Concern    Not on file   Social History Narrative    Not on file         ALLERGIES: Patient has no known allergies. Review of Systems   Constitutional: Negative for fever. HENT: Positive for congestion. Eyes: Negative for visual disturbance. Respiratory: Positive for cough. Negative for chest tightness and shortness of breath. Cardiovascular: Negative for chest pain, palpitations and leg swelling. Gastrointestinal: Negative for abdominal pain, nausea and vomiting. Musculoskeletal: Positive for gait problem. Skin: Negative for rash. Neurological: Positive for light-headedness. Negative for headaches. Psychiatric/Behavioral: Negative for dysphoric mood. Vitals:    03/12/20 0253 03/12/20 0302 03/12/20 0306   BP: 128/78     Pulse: (!) 114 (!) 111    Resp: 16 13    Temp: 99.4 °F (37.4 °C)     SpO2: 93% (!) 88% 93%   Weight: 93.7 kg (206 lb 9.1 oz)     Height: 5' 6\" (1.676 m)              Physical Exam  Constitutional:       General: She is not in acute distress. Appearance: She is well-developed. She is ill-appearing. HENT:      Head: Normocephalic and atraumatic. Mouth/Throat:      Pharynx: No oropharyngeal exudate. Eyes:      General: No scleral icterus. Right eye: No discharge. Left eye: No discharge. Pupils: Pupils are equal, round, and reactive to light. Neck:      Musculoskeletal: Normal range of motion and neck supple. Vascular: No JVD. Cardiovascular:      Rate and Rhythm: Regular rhythm. Tachycardia present. Heart sounds: Normal heart sounds. No murmur.    Pulmonary: Effort: Pulmonary effort is normal. No respiratory distress. Breath sounds: Normal breath sounds. No stridor. No wheezing or rales. Chest:      Chest wall: No tenderness. Abdominal:      General: Bowel sounds are normal. There is no distension. Palpations: Abdomen is soft. There is no mass. Tenderness: There is no abdominal tenderness. There is no guarding or rebound. Musculoskeletal: Normal range of motion. Skin:     General: Skin is warm and dry. Capillary Refill: Capillary refill takes less than 2 seconds. Findings: No rash. Comments: 2 draining incisions bilateral buttocks. Swelling and induration on right. Neurological:      Mental Status: She is oriented to person, place, and time. Psychiatric:         Behavior: Behavior normal.         Thought Content: Thought content normal.         Judgment: Judgment normal.          MDM       Procedures      Labs ok except for elevated glucose. Homeless IDDM with perineal celluliits, abscesses drainage at [facility] yesterday. Presents with generalized weakness, hypoxic with cough (neg cxr) wbc/lactate ok, received zosyn. A lot of drainage from wounds. Due to social situation and IDDM, do not think she will do well outpatient as attempted by [facility]. WIll admit. [facility] for Admission  5:41 AM      ED Room Number: GD97/09  Patient Name and age:  Michelle Degree 44 y.o.  female  Working Diagnosis:   1. Perirectal abscess    2. IDDM (insulin dependent diabetes mellitus) ([facility] [address]    3. Hypoxia      Readmission: no  Isolation Requirements:  no  Recommended Level of Care:  telemetry  Code Status:  Full Code  Department:[facility] Adult ED - [phone]  Other:  44year old female with IDDM, homeless, seen at [facility] yesterday for perirectal abscesses that were drained. D/C with oral antibiotics. Presented this morning with weakness. Presented tachycardia and hypoxic. Cough but no chest pain, no fever. WBC/lacatate ok.  Sugar in 400's. CXR clear. Head ct neg. Abd/pelvis CT c/w cellulitis. She received zosyn. Wounds are draining large amount of pus. Tachycardia improving with fluids.

## 2020-03-12 NOTE — H&P
[facility] Adult  Hospitalist Group  History and Physical    Primary Care Provider: None  Date of Service:  3/12/2020    Subjective:     Lb Zhang is a 44 y.o. female with known hx of DM1, discharged from [facility] on 3/11/20 after I&D for perineal abscesses, incisions on both buttocks, draining pus. Pt is homeless and was discharge to [facility] [address] [facility]. Pt appears sleepy, dozzing off during the conversation, seems like under the influence. Pt stated to use marijuana. Pt felt very weak and unsteady and could not get up and urinated on self, so called 911 for the help and was brought to the ED. Pt denied any fever, chills, abdominal pain, chest pain, SOB, palpitation, dizziness, lightheadedness, nausea, vomiting. Pt is legally blind in R eye. No visible defects noticeable. No other concerns. History was little limited due to patient condition. Review of Systems:    A comprehensive review of systems was negative except for that written in the History of Present Illness. Past Medical History:   Diagnosis Date    Diabetes [facility])       History reviewed. No pertinent surgical history. Prior to Admission medications    Not on File     No Known Allergies   History reviewed. No pertinent family history. SOCIAL HISTORY:  Patient resides at [address] [place] [address]. Patient ambulates independently. Smoking history: 1PPD for more than 10-12 years  Alcohol history: occasional      Objective:       Physical Exam:   General:          Alert, cooperative, no distress, appears stated age. Drowsy and sleepy intermittently but   easily arousable    HEENT:           Atraumatic, anicteric sclerae, pink conjunctivae                          No oral ulcers, mucosa moist, throat clear, dentition fair  Neck:               Supple, symmetrical  Lungs:             Clear to auscultation bilaterally. No Wheezing or Rhonchi. No rales. Chest wall:      No tenderness  No Accessory muscle use.   Heart: Regular  rhythm,  No  murmur   No edema  Abdomen:        Soft, non-tender. Not distended. Bowel sounds normal. Perineal/ buttock dressing in   Place. Exam deferred by pt. Actively menstruating  Extremities:     No cyanosis. No clubbing,                            Skin turgor normal, Capillary refill normal  Skin:                Not pale. Not Jaundiced  No rashes   Psych:             Not anxious or agitated. Neurologic:      Alert, moves all extremities, answers questions appropriately and responds to commands     Cap refill: Brisk, less than 3 seconds  Pulses: 2+, symmetric in all extremities    Data Review: All diagnostic labs and studies have been reviewed. Ct Head Wo Cont    Result Date: 3/12/2020  IMPRESSION: Unremarkable CT of the head. Ct Abd Pelv W Cont    Result Date: 3/12/2020  IMPRESSION: Perineal cellulitis without evidence of abscess. Xr Chest Port    Result Date: 3/12/2020  IMPRESSION: Normal chest.    Recent Results (from the past 24 hour(s))   GLUCOSE, POC    Collection Time: 03/12/20  3:01 AM   Result Value Ref Range    Glucose (POC) 414 (H) 65 - 100 mg/dL    Performed by [address]    Collection Time: 03/12/20  3:04 AM   Result Value Ref Range    SAMPLES BEING HELD 1BLUE 1RED     COMMENT        Add-on orders for these samples will be processed based on acceptable specimen integrity and analyte stability, which may vary by analyte.    CBC WITH AUTOMATED DIFF    Collection Time: 03/12/20  3:05 AM   Result Value Ref Range    WBC 9.5 3.6 - 11.0 K/uL    RBC 3.87 3.80 - 5.20 M/uL    HGB 10.1 (L) 11.5 - 16.0 g/dL    HCT 31.9 (L) 35.0 - 47.0 %    MCV 82.4 80.0 - 99.0 FL    MCH 26.1 26.0 - 34.0 PG    MCHC 31.7 30.0 - 36.5 g/dL    RDW 16.6 (H) 11.5 - 14.5 %    PLATELET 523 818 - 778 K/uL    MPV 11.4 8.9 - 12.9 FL    NRBC 0.0 0  WBC    ABSOLUTE NRBC 0.00 0.00 - 0.01 K/uL    NEUTROPHILS 63 32 - 75 %    LYMPHOCYTES 29 12 - 49 %    MONOCYTES 6 5 - 13 % EOSINOPHILS 1 0 - 7 %    BASOPHILS 0 0 - 1 %    IMMATURE GRANULOCYTES 1 (H) 0.0 - 0.5 %    ABS. NEUTROPHILS 6.1 1.8 - 8.0 K/UL    ABS. LYMPHOCYTES 2.7 0.8 - 3.5 K/UL    ABS. MONOCYTES 0.5 0.0 - 1.0 K/UL    ABS. EOSINOPHILS 0.1 0.0 - 0.4 K/UL    ABS. BASOPHILS 0.0 0.0 - 0.1 K/UL    ABS. IMM. GRANS. 0.1 (H) 0.00 - 0.04 K/UL    DF AUTOMATED     METABOLIC PANEL, COMPREHENSIVE    Collection Time: 03/12/20  3:05 AM   Result Value Ref Range    Sodium 130 (L) 136 - 145 mmol/L    Potassium 4.6 3.5 - 5.1 mmol/L    Chloride 96 (L) 97 - 108 mmol/L    CO2 28 21 - 32 mmol/L    Anion gap 6 5 - 15 mmol/L    Glucose 393 (H) 65 - 100 mg/dL    BUN 10 6 - 20 MG/DL    Creatinine 0.93 0.55 - 1.02 MG/DL    BUN/Creatinine ratio 11 (L) 12 - 20      GFR est AA >60 >60 ml/min/1.73m2    GFR est non-AA >60 >60 ml/min/1.73m2    Calcium 8.5 8.5 - 10.1 MG/DL    Bilirubin, total 0.5 0.2 - 1.0 MG/DL    ALT (SGPT) 25 12 - 78 U/L    AST (SGOT) 45 (H) 15 - 37 U/L    Alk.  phosphatase 101 45 - 117 U/L    Protein, total 8.0 6.4 - 8.2 g/dL    Albumin 2.3 (L) 3.5 - 5.0 g/dL    Globulin 5.7 (H) 2.0 - 4.0 g/dL    A-G Ratio 0.4 (L) 1.1 - 2.2     LACTIC ACID    Collection Time: 03/12/20  3:25 AM   Result Value Ref Range    Lactic acid 1.4 0.4 - 2.0 MMOL/L   HCG URINE, QL. - POC    Collection Time: 03/12/20  3:41 AM   Result Value Ref Range    Pregnancy test,urine (POC) NEGATIVE  NEG     GLUCOSE, POC    Collection Time: 03/12/20  5:24 AM   Result Value Ref Range    Glucose (POC) 395 (H) 65 - 100 mg/dL    Performed by Hemant Celis W/MICROSCOPIC    Collection Time: 03/12/20  5:41 AM   Result Value Ref Range    Color PINK      Appearance CLEAR CLEAR      Specific gravity <1.005     pH (UA) 6.0 5.0 - 8.0      Protein TRACE (A) NEG mg/dL    Glucose >1,000 (A) NEG mg/dL    Ketone NEGATIVE  NEG mg/dL    Bilirubin NEGATIVE  NEG      Blood LARGE (A) NEG      Urobilinogen 1.0 0.2 - 1.0 EU/dL    Nitrites NEGATIVE  NEG      Leukocyte Esterase NEGATIVE  NEG      WBC 0-4 0 - 4 /hpf    RBC >100 (H) 0 - 5 /hpf    Epithelial cells FEW FEW /lpf    Bacteria NEGATIVE  NEG /hpf   URINE CULTURE HOLD SAMPLE    Collection Time: 03/12/20  5:41 AM   Result Value Ref Range    Urine culture hold        Urine on hold in Microbiology dept for 2 days. If unpreserved urine is submitted, it cannot be used for addtional testing after 24 hours, recollection will be required. HEMOGLOBIN A1C WITH EAG    Collection Time: 03/12/20  6:39 AM   Result Value Ref Range    Hemoglobin A1c 11.2 (H) 4.0 - 5.6 %    Est. average glucose 275 mg/dL         Assessment:     Active Problems:    Hypoxia (3/12/2020)      Perianal cellulitis (3/12/2020)      Weakness (3/12/2020)      Hyperglycemia (3/12/2020)    # AMS and weakness, organic vs toxic encephalopathy under unknown influence vs metabolic related to hyperglycemia vs infection  -Admit to inpatient, ALOS >2MN, medical floor.  May need telemonitoring if hypotension persists  -UDS  -IVF hydration for wash out if any drug abuse  -PT/OT    # Hyperglycemia in DM1 patient, likely related to noncompliance  -NPH 15u once now  -Lantus 19u QHS  -Pharmacy to do med-rec  -Get records from [facility]  -DM mx consult  -Humalog 4u TIDAC and SSI normal resistence    # Perineal abscess, s/p I&D at [facility] before DC on Clindamycin 450mg Q6hrs x 10 days on 3/11/20, likely noncompliant  -Cont Zosyn as initiated in ED  -Was dc on Clindamycin  -Follow closely  -Wound care  -GNS consultation if needed  -Tylenol and NSAIDs for pain control  -Caution for     # Hypotension, likely hypovolemia and dehydration related to hyperglycemia  -IVF bolus for 2 additional liters as 500ml/hr and then maintenance at 125ml/hr if BP improved  -May consider Blood cultures if persists or SIRS bocome +    # Hypoxia, likely related to AMS and possible respiratory depression under the influence  -RA currently and breathing well  -Monitor closely    # Hx of Bahcet's disease  -Stable and not on any immunomodulating agent  -If any flare, would consider medrol dose ya or short burst    # Dehydration related to hyperglycemia  -IVF as above    # Marijuana use  -UDS  -Counseled for absteinence  -HIV screen per pt request    # Hematuria  -Active menstruation. No symptoms.  -No further evaluation at present    Full code  Emergency contact: Mother: Eli Amaya, [phone].  Sister: Jailene Sanchez [phone]  Lovenox SQ for DVT Px    Plan:       FUNCTIONAL STATUS PRIOR TO HOSPITALIZATION (including history of recent falls): Ambulates independently     Signed By: Ant Alarcon MD     March 12, 2020

## 2020-03-12 NOTE — ROUTINE PROCESS
TRANSFER - OUT REPORT: 
 
Verbal report given to Celsa (name) on Ayla Holland  being transferred to  (unit) for routine progression of care Report consisted of patients Situation, Background, Assessment and  
Recommendations(SBAR). Information from the following report(s) SBAR, ED Summary, [facility] and Recent Results was reviewed with the receiving nurse. Lines:  
Peripheral IV 03/12/20 Left Hand (Active) Site Assessment Clean, dry, & intact 3/12/2020  3:02 AM  
Phlebitis Assessment 0 3/12/2020  3:02 AM  
Infiltration Assessment 0 3/12/2020  3:02 AM  
Dressing Status Clean, dry, & intact 3/12/2020  3:02 AM  
Dressing Type Transparent 3/12/2020  3:02 AM  
Hub Color/Line Status Pink 3/12/2020  3:02 AM  
Action Taken Catheter retaped 3/12/2020  3:02 AM  
Alcohol Cap Used Yes 3/12/2020  3:02 AM  
   
Peripheral IV 03/12/20 Right Wrist (Active) Site Assessment Clean, dry, & intact 3/12/2020  3:09 AM  
Phlebitis Assessment 0 3/12/2020  3:09 AM  
Infiltration Assessment 0 3/12/2020  3:09 AM  
Dressing Status Clean, dry, & intact 3/12/2020  3:09 AM  
Dressing Type Transparent 3/12/2020  3:09 AM  
Hub Color/Line Status Pink 3/12/2020  3:09 AM  
Action Taken Catheter retaped 3/12/2020  3:09 AM  
Alcohol Cap Used Yes 3/12/2020  3:09 AM  
  
 
Opportunity for questions and clarification was provided. Patient transported with: 
 Monitor Registered Nurse

## 2020-03-13 LAB
ALBUMIN SERPL-MCNC: 1.9 G/DL (ref 3.5–5)
ALBUMIN/GLOB SERPL: 0.4 {RATIO} (ref 1.1–2.2)
ALP SERPL-CCNC: 90 U/L (ref 45–117)
ALT SERPL-CCNC: 17 U/L (ref 12–78)
ANION GAP SERPL CALC-SCNC: 7 MMOL/L (ref 5–15)
AST SERPL-CCNC: 22 U/L (ref 15–37)
BILIRUB SERPL-MCNC: 0.3 MG/DL (ref 0.2–1)
BUN SERPL-MCNC: 6 MG/DL (ref 6–20)
BUN/CREAT SERPL: 10 (ref 12–20)
CALCIUM SERPL-MCNC: 8.4 MG/DL (ref 8.5–10.1)
CHLORIDE SERPL-SCNC: 99 MMOL/L (ref 97–108)
CO2 SERPL-SCNC: 28 MMOL/L (ref 21–32)
CREAT SERPL-MCNC: 0.6 MG/DL (ref 0.55–1.02)
ERYTHROCYTE [DISTWIDTH] IN BLOOD BY AUTOMATED COUNT: 16.7 % (ref 11.5–14.5)
GLOBULIN SER CALC-MCNC: 4.7 G/DL (ref 2–4)
GLUCOSE BLD STRIP.AUTO-MCNC: 118 MG/DL (ref 65–100)
GLUCOSE BLD STRIP.AUTO-MCNC: 126 MG/DL (ref 65–100)
GLUCOSE BLD STRIP.AUTO-MCNC: 158 MG/DL (ref 65–100)
GLUCOSE BLD STRIP.AUTO-MCNC: 248 MG/DL (ref 65–100)
GLUCOSE SERPL-MCNC: 251 MG/DL (ref 65–100)
HCT VFR BLD AUTO: 28.7 % (ref 35–47)
HGB BLD-MCNC: 9 G/DL (ref 11.5–16)
MCH RBC QN AUTO: 26 PG (ref 26–34)
MCHC RBC AUTO-ENTMCNC: 31.4 G/DL (ref 30–36.5)
MCV RBC AUTO: 82.9 FL (ref 80–99)
NRBC # BLD: 0 K/UL (ref 0–0.01)
NRBC BLD-RTO: 0 PER 100 WBC
PLATELET # BLD AUTO: 334 K/UL (ref 150–400)
PMV BLD AUTO: 10.6 FL (ref 8.9–12.9)
POTASSIUM SERPL-SCNC: 3.2 MMOL/L (ref 3.5–5.1)
PROT SERPL-MCNC: 6.6 G/DL (ref 6.4–8.2)
RBC # BLD AUTO: 3.46 M/UL (ref 3.8–5.2)
SERVICE CMNT-IMP: ABNORMAL
SODIUM SERPL-SCNC: 134 MMOL/L (ref 136–145)
WBC # BLD AUTO: 7.1 K/UL (ref 3.6–11)

## 2020-03-13 PROCEDURE — 97165 OT EVAL LOW COMPLEX 30 MIN: CPT

## 2020-03-13 PROCEDURE — 97161 PT EVAL LOW COMPLEX 20 MIN: CPT | Performed by: PHYSICAL THERAPIST

## 2020-03-13 PROCEDURE — 85027 COMPLETE CBC AUTOMATED: CPT

## 2020-03-13 PROCEDURE — 74011250637 HC RX REV CODE- 250/637: Performed by: NURSE PRACTITIONER

## 2020-03-13 PROCEDURE — 74011636637 HC RX REV CODE- 636/637: Performed by: INTERNAL MEDICINE

## 2020-03-13 PROCEDURE — 80053 COMPREHEN METABOLIC PANEL: CPT

## 2020-03-13 PROCEDURE — 65270000032 HC RM SEMIPRIVATE

## 2020-03-13 PROCEDURE — 82962 GLUCOSE BLOOD TEST: CPT

## 2020-03-13 PROCEDURE — 36415 COLL VENOUS BLD VENIPUNCTURE: CPT

## 2020-03-13 PROCEDURE — 97116 GAIT TRAINING THERAPY: CPT | Performed by: PHYSICAL THERAPIST

## 2020-03-13 PROCEDURE — 74011250636 HC RX REV CODE- 250/636: Performed by: INTERNAL MEDICINE

## 2020-03-13 PROCEDURE — 94760 N-INVAS EAR/PLS OXIMETRY 1: CPT

## 2020-03-13 PROCEDURE — 74011250637 HC RX REV CODE- 250/637: Performed by: INTERNAL MEDICINE

## 2020-03-13 PROCEDURE — 74011000258 HC RX REV CODE- 258: Performed by: INTERNAL MEDICINE

## 2020-03-13 RX ORDER — FLUCONAZOLE 100 MG/1
150 TABLET ORAL
Status: COMPLETED | OUTPATIENT
Start: 2020-03-13 | End: 2020-03-13

## 2020-03-13 RX ORDER — POTASSIUM CHLORIDE 750 MG/1
40 TABLET, FILM COATED, EXTENDED RELEASE ORAL
Status: COMPLETED | OUTPATIENT
Start: 2020-03-13 | End: 2020-03-13

## 2020-03-13 RX ORDER — HYDROCODONE BITARTRATE AND ACETAMINOPHEN 5; 325 MG/1; MG/1
1 TABLET ORAL
Status: DISCONTINUED | OUTPATIENT
Start: 2020-03-13 | End: 2020-03-14

## 2020-03-13 RX ADMIN — INSULIN LISPRO 4 UNITS: 100 INJECTION, SOLUTION INTRAVENOUS; SUBCUTANEOUS at 12:40

## 2020-03-13 RX ADMIN — SODIUM CHLORIDE 3.38 G: 900 INJECTION, SOLUTION INTRAVENOUS at 12:42

## 2020-03-13 RX ADMIN — POTASSIUM CHLORIDE 40 MEQ: 750 TABLET, FILM COATED, EXTENDED RELEASE ORAL at 07:33

## 2020-03-13 RX ADMIN — INSULIN LISPRO 3 UNITS: 100 INJECTION, SOLUTION INTRAVENOUS; SUBCUTANEOUS at 07:36

## 2020-03-13 RX ADMIN — ALPRAZOLAM 0.25 MG: 0.25 TABLET ORAL at 18:48

## 2020-03-13 RX ADMIN — INSULIN LISPRO 4 UNITS: 100 INJECTION, SOLUTION INTRAVENOUS; SUBCUTANEOUS at 17:36

## 2020-03-13 RX ADMIN — SODIUM CHLORIDE 125 ML/HR: 900 INJECTION, SOLUTION INTRAVENOUS at 18:48

## 2020-03-13 RX ADMIN — Medication 10 ML: at 17:37

## 2020-03-13 RX ADMIN — Medication 10 ML: at 06:14

## 2020-03-13 RX ADMIN — INSULIN LISPRO 2 UNITS: 100 INJECTION, SOLUTION INTRAVENOUS; SUBCUTANEOUS at 12:41

## 2020-03-13 RX ADMIN — POTASSIUM CHLORIDE 40 MEQ: 750 TABLET, FILM COATED, EXTENDED RELEASE ORAL at 09:41

## 2020-03-13 RX ADMIN — SODIUM CHLORIDE 3.38 G: 900 INJECTION, SOLUTION INTRAVENOUS at 06:14

## 2020-03-13 RX ADMIN — FLUCONAZOLE 150 MG: 100 TABLET ORAL at 23:11

## 2020-03-13 RX ADMIN — SODIUM CHLORIDE 3.38 G: 900 INJECTION, SOLUTION INTRAVENOUS at 21:29

## 2020-03-13 RX ADMIN — PANTOPRAZOLE SODIUM 40 MG: 40 TABLET, DELAYED RELEASE ORAL at 07:36

## 2020-03-13 RX ADMIN — ALPRAZOLAM 0.25 MG: 0.25 TABLET ORAL at 09:41

## 2020-03-13 RX ADMIN — IBUPROFEN 400 MG: 400 TABLET, FILM COATED ORAL at 09:41

## 2020-03-13 RX ADMIN — HYDROCODONE BITARTRATE AND ACETAMINOPHEN 1 TABLET: 5; 325 TABLET ORAL at 21:28

## 2020-03-13 RX ADMIN — ENOXAPARIN SODIUM 40 MG: 40 INJECTION SUBCUTANEOUS at 09:44

## 2020-03-13 RX ADMIN — INSULIN LISPRO 4 UNITS: 100 INJECTION, SOLUTION INTRAVENOUS; SUBCUTANEOUS at 07:37

## 2020-03-13 RX ADMIN — INSULIN GLARGINE 19 UNITS: 100 INJECTION, SOLUTION SUBCUTANEOUS at 21:28

## 2020-03-13 NOTE — PROGRESS NOTES
Bedside and Verbal shift change report given to Davis (oncoming nurse) by Phuong Ibarra (offgoing nurse). Report included the following information SBAR, Kardex and MAR.

## 2020-03-13 NOTE — CONSULTS
Chief Complaint:  Buttock abscesses    HPI:  43 yo woman with hx DM who developed bilateral buttock abscesses. Patient was seen at [facility] and had abscesses drain there. She reported cellulitis developed 5 days earlier. No nausea, vomiting, fever or leukocytosis. Past Medical History:   Diagnosis Date    Diabetes [facility])        History reviewed. No pertinent surgical history. No current facility-administered medications on file prior to encounter. Current Outpatient Medications on File Prior to Encounter   Medication Sig Dispense Refill    ALPRAZolam (XANAX) 0.5 mg tablet Take 0.5 mg by mouth three (3) times daily as needed for Anxiety.  citalopram (CELEXA) 20 mg tablet Take 20 mg by mouth daily.  ergocalciferol (ERGOCALCIFEROL) 1,250 mcg (50,000 unit) capsule Take 50,000 Units by mouth every seven (7) days.  metoprolol succinate (TOPROL-XL) 50 mg XL tablet Take 50 mg by mouth daily.  sacubitriL-valsartan (Entresto) 24-26 mg tablet Take 1 Tab by mouth two (2) times a day.  SITagliptin-metFORMIN (JANUMET) 50-1,000 mg per tablet Take 1 Tab by mouth two (2) times daily (with meals). No Known Allergies    Review of Systems - General ROS: negative  Psychological ROS: negative  Respiratory ROS: negative  Cardiovascular ROS: negative  Gastrointestinal ROS: negative  Skin: bilateral skin abscesses    Visit Vitals  /81 (BP 1 Location: Right arm, BP Patient Position: Sitting)   Pulse 93   Temp 98.6 °F (37 °C)   Resp 16   Ht 5' 6\" (1.676 m)   Wt 215 lb 6.4 oz (97.7 kg)   SpO2 96%   BMI 34.77 kg/m²         Physical Exam:    Gen:  NAD  Pulm:  Unlabored  Right buttock:  Mild erythema and induration with minimal seropurulent drainage  Left buttock:   Indurated with purulent fluid expressed     AP:  43 yo woman with buttock abscesses s/p I&D    - Buttock abscesses:  No further indication for I&D at this time  - Continue local wound care with pack   - Continue antibiotic  - Glycemic control

## 2020-03-13 NOTE — PROGRESS NOTES
DERIC:  1. Diabeties management following. 2. Wound care following. 3. PT/OT consults. 4. [facility] application pending. Care Management Interventions  PCP Verified by CM: Yes  Palliative Care Criteria Met (RRAT>21 & CHF Dx)?: No  Mode of Transport at Discharge: Other (see comment)  MyChart Signup: No  Discharge Durable Medical Equipment: No  Health Maintenance Reviewed: Yes  Speech Therapy Consult: No  Current Support Network: Shelter  Confirm Follow Up Transport: Family  The Plan for Transition of Care is Related to the Following Treatment Goals : Diabeties management, therapy consults, placement  The Patient and/or Patient Representative was Provided with a Choice of Provider and Agrees with the Discharge Plan?: Yes   Resource Information Provided?: No  Discharge Location  Discharge Placement: Shelter    Reason for Admission:   Hypoxia                   RUR Score:    8%                 Plan for utilizing home health:     No indication at this time. PCP: Does not have PCP on file, will offer choice. Current Advanced Directive/Advance Care Plan:  Not on file, her mother is NOK. Transition of Care Plan:                    Reviewed chart for transitions of care,and discussed in rounds. CM met with patient at bedside to explain role and offer support. Patient is alert and oriented x4, and confirmed demographics. Patient admitted from [facility], and patient stated she would like to go to another facility that can help her. Cm offered [facility] and she is agreeable. Patient was not able to ambulate on admission to the bathroom, cm sent [employer] Message to attending MD for PT/OT orders, he will place. CM sent email to Tashia Foster at [facility] for new referral submission. CM offered PCP assistance, patient is interested and will offer choices. CM to follow for discharge needs.     Aline Dawson [facility]

## 2020-03-13 NOTE — PROGRESS NOTES
Problem: Self Care Deficits Care Plan (Adult)  Goal: *Acute Goals and Plan of Care (Insert Text)  Description:   FUNCTIONAL STATUS PRIOR TO ADMISSION: Patient was independent and active without use of DME. Patient was independent for basic and instrumental ADLs. HOME SUPPORT: Patient lives at homeless shelter. Occupational Therapy Goals  Initiated 3/13/2020  1. Patient will perform grooming, standing at sink for at least 5 minutes without LOB, with supervision/set-up within 7 day(s). 2.  Patient will perform lower body dressing with modified independence within 7 day(s). 3.  Patient will perform bathing, standing, with supervision/set-up within 7 day(s). 4.  Patient will perform simple home mgmt task with supervision/set-up within 7 day(s). 5.  Patient will perform all aspects of toileting with independence within 7 day(s). 6.  Patient will utilize energy conservation techniques during functional activities with verbal cues within 7 day(s). Outcome: Progressing Towards Goal     OCCUPATIONAL THERAPY EVALUATION  Patient: Keegan Gross (49 y.o. female)  Date: 3/13/2020  Primary Diagnosis: Hypoxia [R09.02]  Perianal cellulitis [K61.0]  Hyperglycemia [R73.9]  Weakness [R53.1]        Precautions:  Fall    ASSESSMENT  Based on the objective data described below, the patient presents with impaired balance and elevated pain in buttocks impacting ADL performance and mobility following admission for hypoxia and perianal cellulitis. Patient is able to follow all commands and is agreeable to brief activity this session. She performs mobility to bathroom for standing grooming at sink and is able to demos good reach to distal LEs . Observed once LOB during mobility out of bathroom, but pt able to steady herself on the wall. Noted impairment in pt's L eye which may contribute to balance.  Anticipate pt is close to her baseline however will follow in acute setting to maximize safety and independence with ADLs and mobility. Current Level of Function Impacting Discharge (ADLs/self-care): overall supervision to CGA for ADLs    Functional Outcome Measure: The patient scored Total: 60/100 on the Barthel Index outcome measure which is indicative of 40% impaired ability to care for basic self needs/dependency on others      Other factors to consider for discharge: resides in homeless shelter; pt reports poor balance at baseline     Patient will benefit from skilled therapy intervention to address the above noted impairments. PLAN :  Recommendations and Planned Interventions: self care training, functional mobility training, therapeutic exercise, balance training, visual/perceptual training, therapeutic activities, endurance activities, patient education, home safety training and family training/education    Frequency/Duration: Patient will be followed by occupational therapy 2 times a week to address goals. Recommendation for discharge: (in order for the patient to meet his/her long term goals)  No skilled occupational therapy/ follow up rehabilitation needs identified at this time. This discharge recommendation:  Has not yet been discussed the attending provider and/or case management    IF patient discharges home will need the following DME: anticipate none       SUBJECTIVE:   Patient stated I don't want to get in fights and go to snf if I go back there.     OBJECTIVE DATA SUMMARY:   HISTORY:   Past Medical History:   Diagnosis Date    Diabetes ([facility] [address]    History reviewed. No pertinent surgical history.     Expanded or extensive additional review of patient history:     Home Situation  Home Environment: (Homeless Shelter)    Hand dominance: Right    EXAMINATION OF PERFORMANCE DEFICITS:  Cognitive/Behavioral Status:  Neurologic State: Alert  Orientation Level: Oriented X4  Cognition: Follows commands  Perception: Appears intact  Perseveration: No perseveration noted  Safety/Judgement: Fall prevention    Hearing: Auditory  Auditory Impairment: None    Range of Motion:  AROM: Generally decreased, functional    Strength:  Strength: Generally decreased, functional    Coordination:  Coordination: Within functional limits(in the uppers)  Fine Motor Skills-Upper: Left Intact; Right Intact    Gross Motor Skills-Upper: Left Intact; Right Intact    Balance:  Sitting: Intact  Standing: Impaired  Standing - Static: Good  Standing - Dynamic : Fair    Functional Mobility and Transfers for ADLs:  Bed Mobility:  Rolling: Modified independent  Supine to Sit: Modified independent  Sit to Supine: Modified independent  Scooting: Modified independent    Transfers:  Sit to Stand: Stand-by assistance  Stand to Sit: Stand-by assistance  Bathroom Mobility: Stand-by assistance;Contact guard assistance(one instance of LOB)  Toilet Transfer : Stand-by assistance(infer based on observations)    ADL Assessment:  Feeding: Independent    Oral Facial Hygiene/Grooming: Stand-by assistance(standing at sink)    Bathing: Contact guard assistance(infer for stability during standing bathing tasks)    Upper Body Dressing: Setup    Lower Body Dressing: Setup;Contact guard assistance(for stability during standing portion)    Toileting: Stand by assistance;Contact guard assistance    ADL Intervention and task modifications:  Grooming  Grooming Assistance: Stand-by assistance  Position Performed: Standing(at sink)  Washing Hands: Stand-by assistance    Lower Body Dressing Assistance  Socks: (pt able to demos reach to distal LEs while seated EOB)    Cognitive Retraining  Safety/Judgement: Fall prevention    Functional Measure:  Barthel Index:    Bathin  Bladder: 10  Bowels: 10  Groomin  Dressin  Feeding: 10  Mobility: 0  Stairs: 5  Toilet Use: 5  Transfer (Bed to Chair and Back): 10  Total: 60/100        The Barthel ADL Index: Guidelines  1.  The index should be used as a record of what a patient does, not as a record of what a patient could do. 2. The main aim is to establish degree of independence from any help, physical or verbal, however minor and for whatever reason. 3. The need for supervision renders the patient not independent. 4. A patient's performance should be established using the best available evidence. Asking the patient, friends/relatives and nurses are the usual sources, but direct observation and common sense are also important. However direct testing is not needed. 5. Usually the patient's performance over the preceding 24-48 hours is important, but occasionally longer periods will be relevant. 6. Middle categories imply that the patient supplies over 50 per cent of the effort. 7. Use of aids to be independent is allowed. Jessica Thomas., Barthel, D.W. ([id number]). Functional evaluation: the Barthel Index. [address] [facility] (14)2. JOSE Thomas Ace, Christopher Chappell., Lorraine Coppola., [place], [address] [facility] (1999). Measuring the change indisability after inpatient rehabilitation; comparison of the responsiveness of the Barthel Index and Functional [place] Measure. Journal of Neurology, Neurosurgery, and Psychiatry, 66(4), 822-802. Faith Quijano, N.J.A, EMORY Benitez, & Ed Fuller M.A. (2004.) Assessment of post-stroke quality of life in cost-effectiveness studies: The usefulness of the Barthel Index and the EuroQoL-5D. Quality of Life Research, 15, 350-66     Occupational Therapy Evaluation Charge Determination   History Examination Decision-Making   LOW Complexity : Brief history review  LOW Complexity : 1-3 performance deficits relating to physical, cognitive , or psychosocial skils that result in activity limitations and / or participation restrictions  MEDIUM Complexity : Patient may present with comorbidities that affect occupational performnce.  Miniml to moderate modification of tasks or assistance (eg, physical or verbal ) with assesment(s) is necessary to enable patient to complete evaluation       Based on the above components, the patient evaluation is determined to be of the following complexity level: LOW   Pain Rating:  Pt reporting elevated pain in buttocks due to perianal cellulitis; not limiting to participation    Activity Tolerance:   Fair  Please refer to the flowsheet for vital signs taken during this treatment. After treatment patient left in no apparent distress:    Supine in bed, Call bell within reach and Side rails x 3    COMMUNICATION/EDUCATION:   The patients plan of care was discussed with: Physical therapist and Registered nurse. Patient/family have participated as able in goal setting and plan of care. and Patient/family agree to work toward stated goals and plan of care. This patients plan of care is appropriate for delegation to [facility].     Thank you for this referral.  Neal Fontanez OT  Time Calculation: 9 mins

## 2020-03-13 NOTE — DIABETES MGMT
[facility]  CLINICAL NURSE SPECIALIST   Followup Progress Note    Presentation   Jessica Andrade is a 44 y.o. female admitted with perirectal abscesses and consulted by Provider for advanced specialty nursing care related to inpatient diabetes management. Hyperglycemia management order set is in place. Subjective   Ms Curt Page remains in acute care. No further interventions needed from a surgical standpoint for her rectal abscess. Basal insulin initiated overnight with correctional insulin. Blood glucose 154-408 in the past 24 hours. Objective   Physical exam    General Alert, oriented and in no acute distress/ill-appearing. Conversant and cooperative  Vital Signs   Visit Vitals  /81 (BP 1 Location: Right arm, BP Patient Position: Sitting)   Pulse 93   Temp 98.6 °F (37 °C)   Resp 16   Ht 5' 6\" (1.676 m)   Wt 97.7 kg (215 lb 6.4 oz)   SpO2 96%   BMI 34.77 kg/m²     Skin  Warm and dry  Heart   Regular rate and rhythm.  No murmurs, rubs or gallops  Lungs  Clear to auscultation without rales or rhonchi  Extremities No foot wounds    Laboratory      CBC W/O DIFF    Collection Time: 03/13/20  3:47 AM   Result Value Ref Range    WBC 7.1 3.6 - 11.0 K/uL    RBC 3.46 (L) 3.80 - 5.20 M/uL    HGB 9.0 (L) 11.5 - 16.0 g/dL    HCT 28.7 (L) 35.0 - 47.0 %    MCV 82.9 80.0 - 99.0 FL    MCH 26.0 26.0 - 34.0 PG    MCHC 31.4 30.0 - 36.5 g/dL    RDW 16.7 (H) 11.5 - 14.5 %    PLATELET 381 525 - 034 K/uL    MPV 10.6 8.9 - 12.9 FL    NRBC 0.0 0  WBC    ABSOLUTE NRBC 0.00 0.00 - 0.01 K/uL     BMP:   Lab Results   Component Value Date/Time     (L) 03/13/2020 03:47 AM    K 3.2 (L) 03/13/2020 03:47 AM    CL 99 03/13/2020 03:47 AM    CO2 28 03/13/2020 03:47 AM    AGAP 7 03/13/2020 03:47 AM     (H) 03/13/2020 03:47 AM    BUN 6 03/13/2020 03:47 AM    CREA 0.60 03/13/2020 03:47 AM    GFRAA >60 03/13/2020 03:47 AM    GFRNA >60 03/13/2020 03:47 AM          Blood glucose pattern    Assessment and Plan   Nursing Diagnosis Risk for unstable blood glucose pattern   Nursing Intervention Domain 1802 Decision-making Support   Nursing Interventions Examined current inpatient diabetes control   Explored factors facilitating and impeding inpatient management  Identified self-management practices impeding diabetes control  Explored corrective strategies with patient and responsible inpatient provider   Informed patient of rational for basal bolus insulin strategy while hospitalized       Evaluation   Ms Milly Brooks is a 44year old female with a history of uncontrolled type two diabetes diabetes who presented to the ED last night for c/o weakness, lethargy and incontinence. Of note, she was discharged from [facility] two days prior after I&D for perineal abscesses, incisions on both buttocks, draining pus. In the [address] [place] [address] ED, she was noted to have an altered mental status with a blood glucose in the 400s and an A1C of 11.2%.     Basal and correctional insulin were initiated in the past 24 hours. Her fasting blood glucose above goal at 248 this morning. Tolerating PO well. Insulin adjustments can be made in the inpatient setting to obtain a goal blood glucose of 100-180.         Recommendations   Recommend:  Initiate the subcutaneous insulin orderset:  1. Adjust Lantus: to 25 units Daily    Daily evaluation fasting blood glucose will be required to determine the appropriate amount of Lantus. If fasting blood glucose is over 200, please add the total amount of correctional Humalog received the day before to total Lantus dose.       2. Low dose bolus with meals: (0.05 units/kg): 5 units Humalog with meals  If pre-prandial glucose remains over 200, please adjust Humalog to 8 units with meals.     3. Correctional insulin for obese/resistant patients     4. Diabetic Diet     5. Case Management      On discharge: Continue metformin 1000 mg BID    While insulin is appropriate for her diabetes; she does not have resources to properly store.   I also am not confident in her mental capability and skill to administer.   Please consider consider switching to a cost effective pill such as Amaryl 2 mg Daily      Billing Code(s)   [address] ROS[address] Sánchez  [phone]

## 2020-03-13 NOTE — PROGRESS NOTES
[facility] Adult  Hospitalist Group                                                                                          Hospitalist Progress Note  Sharon Collins MD  Answering service: [phone] OR [phone] from in house phone        Date of Service:  3/13/2020  NAME:  Jessica Andrade  :  1980  MRN:  [id number]      Admission Summary:   Jessica Andrade is a 44 y.o. female with known hx of DM1, discharged from [address] [place] [address] on 3/11/20 after I&D for perineal abscesses, incisions on both buttocks, draining pus. Pt is homeless and was discharge to [address] [place] [address]. Pt appears sleepy, dozzing off during the conversation, seems like under the influence. Pt stated to use marijuana. Pt felt very weak and unsteady and could not get up and urinated on self, so called 911 for the help and was brought to the ED. Pt denied any fever, chills, abdominal pain, chest pain, SOB, palpitation, dizziness, lightheadedness, nausea, vomiting. Pt is legally blind in R eye. No visible defects noticeable. No other concerns. History was little limited due to patient condition. Interval history / Subjective: Follow up AMS, weakness, hyperglycemia, perineal abscesses s/p I&D at [address] [place] [address] 3/11. Patient seen and examined at the bedside. Labs, images and notes reviewed  Discussed with nursing staff, orders reviewed. Plan discussed with patient/Family    Pt is feeling little better with mentation. Some pain bothering at surgical sites. Wound care is ongoing. Assessment & Plan:     # AMS and weakness, likely THC + related  -Admit to inpatient, ALOS >2MN, medical floor. -UDS Noted + for THC  -IVF hydration  -PT/OT     # Hyperglycemia in DM1 patient, likely related to noncompliance. Better controlled  -Overall poorly controled with A1C 11.2  -NPH 15u once on admission  -Lantus 19u QHS. Seems to be working.  Continue current mx and follow serial accuchecks closely  -DM mx consult, appreciate  -Humalog 4u TIDAC and SSI normal resistence     # Perineal abscess, s/p I&D at [facility] before DC on Clindamycin 450mg Q6hrs x 10 days on 3/11/20, likely noncompliant  -Cont Zosyn as initiated in ED  -Was dc on Clindamycin. Will reassess and plan. Will transition to PO once clinically more stable  -GNS consult for reassessment  -Wound care team on board  -Tylenol and NSAIDs for pain control  -Caution for opiate use given the AMS and concern for being under the influence      # Hypotension, likely hypovolemia and dehydration related to hyperglycemia, somewhat better. -BP still soft  -Continue maintenance IVF NS at 125ml/hr  -May consider repeat Blood cultures if persists or SIRS bocome +     # Hypoxia, likely related to AMS and possible respiratory depression under the influence  -RA currently and breathing well  -Monitor closely     # Hx of Bahcet's disease  -Stable and not on any immunomodulating agent  -If any flare, would consider medrol dose ya or short burst     # Dehydration related to hyperglycemia  -IVF as above     # Marijuana use  -UDS +  -Counseled for absteinence  -HIV screen per pt request, non-reactive     # Hematuria  -Active menstruation. No symptoms.  -No further evaluation at present     Emergency contact: Mother: Val Jin, [phone].  Sister: Jamal Kirby [phone]    Code status: Full  DVT prophylaxis: Lovenox SQ    Care Plan discussed with: Patient/Family, Nurse and Other Wound care  Anticipated Disposition: Home w/Family  Anticipated Discharge: Greater than 48 hours     Hospital Problems  Never Reviewed          Codes Class Noted POA    Hypoxia ICD-10-CM: R09.02  ICD-9-CM: 799.02  3/12/2020 Unknown        Perianal cellulitis ICD-10-CM: K61.0  ICD-9-CM: 638  3/12/2020 Unknown        Weakness ICD-10-CM: R53.1  ICD-9-CM: 780.79  3/12/2020 Unknown        Hyperglycemia ICD-10-CM: R73.9  ICD-9-CM: 790.29  3/12/2020 Unknown                Review of Systems:   A comprehensive review of systems was negative except for that written in the HPI.       Vital Signs:    Last 24hrs VS reviewed since prior progress note. Most recent are:  Visit Vitals  /72 (BP 1 Location: Right arm, BP Patient Position: At rest)   Pulse 92   Temp 98.2 °F (36.8 °C)   Resp 16   Ht 5' 6\" (1.676 m)   Wt 93.7 kg (206 lb 9.1 oz)   SpO2 96%   BMI 33.34 kg/m²         Intake/Output Summary (Last 24 hours) at 3/13/2020 0411  Last data filed at 3/12/2020 9641  Gross per 24 hour   Intake 1200 ml   Output    Net 1200 ml        Physical Examination:   General:          Alert, cooperative, no distress, appears stated age. more clear mentation HEENT:           Atraumatic, anicteric sclerae, pink conjunctivae                          No oral ulcers, mucosa moist, throat clear, dentition fair  Neck:               Supple, symmetrical  Lungs:             Clear to auscultation bilaterally.  No Wheezing or Rhonchi. No rales. Chest wall:      No tenderness  No Accessory muscle use. Heart:              Regular  rhythm,  No  murmur   No edema  Abdomen:        Soft, non-tender. Not distended.  Bowel sounds normal. Perineal/ buttock   dressing in  place. Exam deferred by pt. Actively menstruating  Extremities:     No cyanosis.  No clubbing,                            Skin turgor normal, Capillary refill normal  Skin:                Not pale.  Not Jaundiced  No rashes   Psych:             Not anxious or agitated. Neurologic:      Alert, moves all extremities, answers questions appropriately and responds to   commands        Data Review:    Review and/or order of clinical lab test  Review and/or order of tests in the radiology section of CPT  Review and/or order of tests in the medicine section of CPT    Ct Head Wo Cont    Result Date: 3/12/2020  IMPRESSION: Unremarkable CT of the head. Ct Abd Pelv W Cont    Result Date: 3/12/2020  IMPRESSION: Perineal cellulitis without evidence of abscess.     Xr Chest Port    Result Date: 3/12/2020  IMPRESSION: Normal chest.      Labs:     Recent Labs 03/12/20  0305   WBC 9.5   HGB 10.1*   HCT 31.9*        Recent Labs     03/12/20  0305   *   K 4.6   CL 96*   CO2 28   BUN 10   CREA 0.93   *   CA 8.5   MG 2.7*   PHOS 2.9     Recent Labs     03/12/20  0305   SGOT 45*   ALT 25      TBILI 0.5   TP 8.0   ALB 2.3*   GLOB 5.7*     No results for input(s): INR, PTP, APTT, INREXT in the last 72 hours. No results for input(s): FE, TIBC, PSAT, FERR in the last 72 hours. No results found for: FOL, RBCF   No results for input(s): PH, PCO2, PO2 in the last 72 hours. No results for input(s): CPK, CKNDX, TROIQ in the last 72 hours.     No lab exists for component: CPKMB  No results found for: CHOL, CHOLX, CHLST, CHOLV, HDL, HDLP, LDL, LDLC, DLDLP, TGLX, TRIGL, TRIGP, CHHD, CHHDX  Lab Results   Component Value Date/Time    Glucose (POC) 166 (H) 03/12/2020 10:05 PM    Glucose (POC) 154 (H) 03/12/2020 04:38 PM    Glucose (POC) 408 (H) 03/12/2020 02:08 PM    Glucose (POC) 377 (H) 03/12/2020 11:50 AM    Glucose (POC) 395 (H) 03/12/2020 05:24 AM     Lab Results   Component Value Date/Time    Color PINK 03/12/2020 05:41 AM    Appearance CLEAR 03/12/2020 05:41 AM    Specific gravity <1.005 03/12/2020 05:41 AM    pH (UA) 6.0 03/12/2020 05:41 AM    Protein TRACE (A) 03/12/2020 05:41 AM    Glucose >1,000 (A) 03/12/2020 05:41 AM    Ketone NEGATIVE  03/12/2020 05:41 AM    Bilirubin NEGATIVE  03/12/2020 05:41 AM    Urobilinogen 1.0 03/12/2020 05:41 AM    Nitrites NEGATIVE  03/12/2020 05:41 AM    Leukocyte Esterase NEGATIVE  03/12/2020 05:41 AM    Epithelial cells FEW 03/12/2020 05:41 AM    Bacteria NEGATIVE  03/12/2020 05:41 AM    WBC 0-4 03/12/2020 05:41 AM    RBC >100 (H) 03/12/2020 05:41 AM         Medications Reviewed:     Current Facility-Administered Medications   Medication Dose Route Frequency    sodium chloride (NS) flush 5-40 mL  5-40 mL IntraVENous Q8H    sodium chloride (NS) flush 5-40 mL  5-40 mL IntraVENous PRN    insulin lispro (HUMALOG) injection   SubCUTAneous AC&HS    sodium chloride (NS) flush 5-40 mL  5-40 mL IntraVENous Q8H    sodium chloride (NS) flush 5-40 mL  5-40 mL IntraVENous PRN    acetaminophen (TYLENOL) tablet 650 mg  650 mg Oral Q4H PRN    ondansetron (ZOFRAN) injection 4 mg  4 mg IntraVENous Q4H PRN    ibuprofen (MOTRIN) tablet 400 mg  400 mg Oral Q6H PRN    enoxaparin (LOVENOX) injection 40 mg  40 mg SubCUTAneous Q24H    glucose chewable tablet 16 g  4 Tab Oral PRN    glucagon (GLUCAGEN) injection 1 mg  1 mg IntraMUSCular PRN    dextrose 10% infusion 0-250 mL  0-250 mL IntraVENous PRN    insulin glargine (LANTUS) injection 19 Units  0.2 Units/kg SubCUTAneous QHS    0.9% sodium chloride infusion  125 mL/hr IntraVENous CONTINUOUS    nicotine (NICODERM CQ) 21 mg/24 hr patch 1 Patch  1 Patch TransDERmal Q24H    piperacillin-tazobactam (ZOSYN) 3.375 g in 0.9% sodium chloride (MBP/ADV) 100 mL  3.375 g IntraVENous Q8H    insulin lispro (HUMALOG) injection 4 Units  4 Units SubCUTAneous TIDAC    pantoprazole (PROTONIX) tablet 40 mg  40 mg Oral ACB    ALPRAZolam (XANAX) tablet 0.25 mg  0.25 mg Oral BID     ______________________________________________________________________  EXPECTED LENGTH OF STAY: 2d 21h  ACTUAL LENGTH OF STAY:          1                 Yogi Anton MD

## 2020-03-13 NOTE — WOUND CARE
WOCN Note:  
  
Follow up for right and left buttock abscess sites that were I&Ded at [address] [place] [address] recently. Patient was discharged from [address] [place] [address] on 3.11.2020. Dr Saadia Wilkinson evaluated wounds and determined that they should continue to be packed. Chart reviewed. Admitted DX:  Hypoxia; Perianal cellulitis; Hyperglycemia; Weakness Past Medical History:  DM; legally blind in Right eye; homeless 
  
Assessment:  
Patient is A&O x 3, communicative and mobile independently in bed. Bed: [place] Patient wearing briefs for incontinence/menses. Patient reports pain to the buttocks. Heels intact without erythema. Sacrum intact without erythema. 
  
1. POA Left buttock, s/p I&D abscess site: 0.5 x 2 x 4.5 cm;  Unable to see depth of wound; there is yellow slough coming out of wound; moderate amount of purulent drainage; no odor; induration and pink tenderness to the periwound. 2.  POA Right buttock, s/p I&D abscess site:  0.7 x 1.3 x 3.5 cm; unable to see depth of wound; the area that is visible is pink; small purulent drainage; no odor; induration to the periwound. 
  
Wound, Pressure Prevention & Skin Care Recommendations: 1. Minimize layers of linen/pads under patient to optimize support surface. 2.  Turn/reposition approximately every 2 hours and offload heels. 3.   Right and left buttocks:  Pack with 1 continuous length of half width [place]; ensure that an ample amount is outside of the wound; cover with dry gauze and secure with tegaderm. 
  
Discussed above plan with patient and Cameron Delvalle RN. 
  
Transition of Care: Plan to follow as needed while admitted to hospital.  Meeting Dr Jany Oden tomorrow to see patient and reassess wound. 
  
Akash Grande, MOHITN RN [facility] Inpatient Wound Care Available on Perfect Serve Pager [phone] Office [phone]

## 2020-03-13 NOTE — PROGRESS NOTES
Bedside shift change report given to [facility] (oncoming nurse) by Jo-Ann Small (offgoing nurse). Report included the following information SBAR, Kardex, MAR and Recent Results.

## 2020-03-13 NOTE — PROGRESS NOTES
Problem: Mobility Impaired (Adult and Pediatric)  Goal: *Acute Goals and Plan of Care (Insert Text)  Description: FUNCTIONAL STATUS PRIOR TO ADMISSION: Patient was independent and active without use of DME. Reports \"balance\" issues at baseline but no falls    HOME SUPPORT PRIOR TO ADMISSION: Patient was living in [address] [place] [address] at baseline. Has family but she cannot live with them and they cannot assist her (per patient)    Physical Therapy Goals  Initiated 3/13/2020  1. Patient will move from supine to sit and sit to supine  in bed with modified independence within 7 day(s). 2.  Patient will transfer from bed to chair and chair to bed with modified independence using the least restrictive device within 7 day(s). 3.  Patient will perform sit to stand with modified independence within 7 day(s). 4.  Patient will ambulate with modified independence for 250 feet with the least restrictive device within 7 day(s). Outcome: Progressing Towards Goal   PHYSICAL THERAPY EVALUATION  Patient: Cedric Peterson (58 y.o. female)  Date: 3/13/2020  Primary Diagnosis: Hypoxia [R09.02]  Perianal cellulitis [K61.0]  Hyperglycemia [R73.9]  Weakness [R53.1]        Precautions:   Fall      ASSESSMENT  Based on the objective data described below, the patient presents with decreased functional mobility from baseline level of function. Patient limited by pain as well as impaired balance. Needing CGA for ambulation and is generally unsteady. She states this is \"her baseline\" but \"has not fallen\". Anticipate she is not far from her baseline but will follow for 1-2 additional visits to ensure she is safe. Anticipate she will be safe to DC back to Shelter when medically stable. Current Level of Function Impacting Discharge (mobility/balance): CGA        Other factors to consider for discharge: at risk     Patient will benefit from skilled therapy intervention to address the above noted impairments.        PLAN :  Recommendations and Planned Interventions: bed mobility training, transfer training, gait training, therapeutic exercises, neuromuscular re-education, patient and family training/education, and therapeutic activities      Frequency/Duration: Patient will be followed by physical therapy:  2 times a week to address goals. Recommendation for discharge: (in order for the patient to meet his/her long term goals)  No skilled physical therapy/ follow up rehabilitation needs identified at this time. IF patient discharges home will need the following DME: none         SUBJECTIVE:   Patient stated I am off balance on the time.     OBJECTIVE DATA SUMMARY:   HISTORY:    Past Medical History:   Diagnosis Date    Diabetes ([facility] [address]    History reviewed. No pertinent surgical history. Personal factors and/or comorbidities impacting plan of care:     Home Situation  Home Environment: (Homeless Shelter)    EXAMINATION/PRESENTATION/DECISION MAKING:   Critical Behavior:  Neurologic State: Alert  Orientation Level: Oriented X4  Cognition: Appropriate decision making, Follows commands     Hearing:   Auditory  Auditory Impairment: None    Range Of Motion:  AROM: Generally decreased, functional                       Strength:    Strength: Generally decreased, functional                Functional Mobility:  Bed Mobility:  Rolling: Modified independent  Supine to Sit: Modified independent  Sit to Supine: Modified independent  Scooting: Modified independent  Transfers:  Sit to Stand: Stand-by assistance  Stand to Sit: Stand-by assistance                       Balance:   Sitting: Intact  Standing: Impaired  Standing - Static: Good  Standing - Dynamic : Fair  Ambulation/Gait Training:  Distance (ft): 20 Feet (ft)  Assistive Device: Gait belt  Ambulation - Level of Assistance: Contact guard assistance     Gait Description (WDL): Exceptions to WDL  Gait Abnormalities: Decreased step clearance;Shuffling gait        Base of Support: Center of gravity altered; Widened     Speed/Elizabeth: Pace decreased (<100 feet/min); Slow  Step Length: Left shortened;Right shortened       Pain Rating:  No c/o pain during session    Activity Tolerance:   Good  Please refer to the flowsheet for vital signs taken during this treatment. After treatment patient left in no apparent distress:   Sitting in chair, Call bell within reach, and Caregiver / family present    COMMUNICATION/EDUCATION:   The patients plan of care was discussed with: Physical therapist, Occupational therapist, and Registered nurse. Fall prevention education was provided and the patient/caregiver indicated understanding., Patient/family have participated as able in goal setting and plan of care. , and Patient/family agree to work toward stated goals and plan of care.     Thank you for this referral.  Marina Schofield, PT, DPT   Time Calculation: 10 mins

## 2020-03-13 NOTE — PROGRESS NOTES
Patient wound packing came out and patient refused to have wound re packed. Dry dressing over wound. Changed 3 times this night.

## 2020-03-13 NOTE — PHYSICIAN ADVISORY
Letter of Status Determination: Current Status INPATIENT is Appropriate Pt Name:  Román Garner MR#  [id number] [facility]#   [id number] Room [facility] Hospital  208/01  @ . [facility] hospital  
Hospitalization date  3/12/2020  2:43 AM  
Current Attending Physician  Francisco Lunsford MD  
Principal diagnosis  Cellulitis Corinne Messing is a 44 y.o. female with known hx of DM1, discharged from [facility] on 3/11/20 after I&D for perineal abscesses, incisions on both buttocks, draining pus. Pt is homeless and was discharge to [address] [place] [address]. Pt appears sleepy, dozzing off during the conversation, seems like under the influence. Pt stated to use marijuana. Pt felt very weak and unsteady and could not get up and urinated on self, so called [facility] for the help and was brought to the ED. Pt denied any fever, chills, abdominal pain, chest pain, SOB, palpitation, dizziness, lightheadedness, nausea, vomiting. Pt is legally blind in R eye. No visible defects noticeable. No other concerns. History was little limited due to patient condition Pt with B/L gluteal abscess, on IV abx [place] MCG criteria Does  NOT apply STATUS DETERMINATION  On the basis of clinical data, available documentaion, we believe that the current status of this patient as INPATIENT is Appropriate The final decision of the patient's hospitalization status depends on the attending physician's judgment Additional comments Insurance  Payor: [facility] [place]ICAID / [place]: [place] [employer] [employer] / Product Type: Managed Care Medicaid / Insurance Information [facility] [place] [address] [facility] Phone:   
 Subscriber: Juan Howell Subscriber#: [id number] Group#:  Precert#:   
  
 
  
 
 
 
 
Jean Bolaños MD 
Cell: [phone] Physician Advisor

## 2020-03-13 NOTE — PROGRESS NOTES
[facility]      Subjective     No acute events. Tolerated dressing change earlier today to bilateral perirectal abscess wounds. Pain significantly improved she reports. Objective     Patient Vitals for the past 24 hrs:   Temp Pulse Resp BP SpO2   03/13/20 1434 97.8 °F (36.6 °C) 86 18 102/70 94 %   03/13/20 0835 97 °F (36.1 °C) 100 19 110/81 94 %   03/13/20 0404 98.6 °F (37 °C) 93 16 115/81 96 %   03/12/20 2210 98.2 °F (36.8 °C) 92 16 112/72 96 %   03/12/20 1612 97.6 °F (36.4 °C) 82 16 94/60 97 %       Date 03/12/20 0700 - 03/13/20 0659 03/13/20 0700 - 03/14/20 0659   Shift 6262-6450 9055-2091 24 Hour Total 3652-1841 7660-5298 24 Hour Total   INTAKE   P.O.    120  120     P. O.    120  120   I. V.(mL/kg/hr) 1200(1.1)  1200(0.5)        Volume (sodium chloride 0.9 % bolus infusion 100 mL) 100  100        Volume (sodium chloride 0.9 % bolus infusion 1,000 mL) 1000  1000        Volume (piperacillin-tazobactam (ZOSYN) 3.375 g in 0.9% sodium chloride (MBP/ADV) 100 mL) 100  100      Shift Total(mL/kg) 1200(12.8)  1200(12.3) 120(1.2)  120(1.2)   OUTPUT   Urine(mL/kg/hr)           Urine Occurrence(s)  4 x 4 x      Shift Total(mL/kg)         NET 1200  1200 120  120   Weight (kg) 93.7 97.7 97.7 97.7 97.7 97.7       PE  GEN - Awake, alert, communicating appropriately. NAD  Perineum - Bilateral perirectal abscesses s/p I&D. No signficant residual induration or erythema. No fluctuance. Appropriately tender.       Labs  Recent Results (from the past 24 hour(s))   GLUCOSE, POC    Collection Time: 03/12/20  4:38 PM   Result Value Ref Range    Glucose (POC) 154 (H) 65 - 100 mg/dL    Performed by [address] [place] [address], POC    Collection Time: 03/12/20 10:05 PM   Result Value Ref Range    Glucose (POC) 166 (H) 65 - 100 mg/dL    Performed by Robert Nevarez    CBC W/O DIFF    Collection Time: 03/13/20  3:47 AM   Result Value Ref Range    WBC 7.1 3.6 - 11.0 K/uL    RBC 3.46 (L) 3.80 - 5.20 M/uL    HGB 9.0 (L) 11.5 - 16.0 g/dL    HCT 28.7 (L) 35.0 - 47.0 %    MCV 82.9 80.0 - 99.0 FL    MCH 26.0 26.0 - 34.0 PG    MCHC 31.4 30.0 - 36.5 g/dL    RDW 16.7 (H) 11.5 - 14.5 %    PLATELET 960 149 - 137 K/uL    MPV 10.6 8.9 - 12.9 FL    NRBC 0.0 0  WBC    ABSOLUTE NRBC 0.00 0.00 - 1.93 K/uL   METABOLIC PANEL, COMPREHENSIVE    Collection Time: 03/13/20  3:47 AM   Result Value Ref Range    Sodium 134 (L) 136 - 145 mmol/L    Potassium 3.2 (L) 3.5 - 5.1 mmol/L    Chloride 99 97 - 108 mmol/L    CO2 28 21 - 32 mmol/L    Anion gap 7 5 - 15 mmol/L    Glucose 251 (H) 65 - 100 mg/dL    BUN 6 6 - 20 MG/DL    Creatinine 0.60 0.55 - 1.02 MG/DL    BUN/Creatinine ratio 10 (L) 12 - 20      GFR est AA >60 >60 ml/min/1.73m2    GFR est non-AA >60 >60 ml/min/1.73m2    Calcium 8.4 (L) 8.5 - 10.1 MG/DL    Bilirubin, total 0.3 0.2 - 1.0 MG/DL    ALT (SGPT) 17 12 - 78 U/L    AST (SGOT) 22 15 - 37 U/L    Alk. phosphatase 90 45 - 117 U/L    Protein, total 6.6 6.4 - 8.2 g/dL    Albumin 1.9 (L) 3.5 - 5.0 g/dL    Globulin 4.7 (H) 2.0 - 4.0 g/dL    A-G Ratio 0.4 (L) 1.1 - 2.2     GLUCOSE, POC    Collection Time: 03/13/20  7:20 AM   Result Value Ref Range    Glucose (POC) 248 (H) 65 - 100 mg/dL    Performed by Kale Flood    GLUCOSE, POC    Collection Time: 03/13/20 11:17 AM   Result Value Ref Range    Glucose (POC) 158 (H) 65 - 100 mg/dL    Performed by Yoni Titi is a 44 y. o.yr old female with bilateral perirectal abscesses s/p I&D at [facility]. Plan     - Continue wound care. Appreciate input from wound care team.   - Could be discharged with home health/wound care from surgical perspective but it seems like this may be difficult due to lack of stable home.     - Antibiotics per primary team    Tello Francis MD  3/13/2020  3:49 PM

## 2020-03-13 NOTE — PROGRESS NOTES
Problem: Pressure Injury - Risk of  Goal: *Prevention of pressure injury  Description: Document Nicholas Scale and appropriate interventions in the flowsheet.   Note: Pressure Injury Interventions:  Sensory Interventions: Assess changes in LOC         Activity Interventions: Increase time out of bed    Mobility Interventions: Pressure redistribution bed/mattress (bed type)    Nutrition Interventions: Document food/fluid/supplement intake    Friction and Shear Interventions: HOB 30 degrees or less

## 2020-03-14 LAB
ANION GAP SERPL CALC-SCNC: 6 MMOL/L (ref 5–15)
BUN SERPL-MCNC: 2 MG/DL (ref 6–20)
BUN/CREAT SERPL: 3 (ref 12–20)
CALCIUM SERPL-MCNC: 8.2 MG/DL (ref 8.5–10.1)
CHLORIDE SERPL-SCNC: 107 MMOL/L (ref 97–108)
CO2 SERPL-SCNC: 27 MMOL/L (ref 21–32)
CREAT SERPL-MCNC: 0.66 MG/DL (ref 0.55–1.02)
ERYTHROCYTE [DISTWIDTH] IN BLOOD BY AUTOMATED COUNT: 16.8 % (ref 11.5–14.5)
GLUCOSE BLD STRIP.AUTO-MCNC: 141 MG/DL (ref 65–100)
GLUCOSE BLD STRIP.AUTO-MCNC: 143 MG/DL (ref 65–100)
GLUCOSE BLD STRIP.AUTO-MCNC: 167 MG/DL (ref 65–100)
GLUCOSE BLD STRIP.AUTO-MCNC: 202 MG/DL (ref 65–100)
GLUCOSE SERPL-MCNC: 112 MG/DL (ref 65–100)
HCT VFR BLD AUTO: 28.8 % (ref 35–47)
HGB BLD-MCNC: 8.8 G/DL (ref 11.5–16)
MAGNESIUM SERPL-MCNC: 1.7 MG/DL (ref 1.6–2.4)
MCH RBC QN AUTO: 25.9 PG (ref 26–34)
MCHC RBC AUTO-ENTMCNC: 30.6 G/DL (ref 30–36.5)
MCV RBC AUTO: 84.7 FL (ref 80–99)
NRBC # BLD: 0.03 K/UL (ref 0–0.01)
NRBC BLD-RTO: 0.5 PER 100 WBC
PLATELET # BLD AUTO: 344 K/UL (ref 150–400)
PMV BLD AUTO: 10.1 FL (ref 8.9–12.9)
POTASSIUM SERPL-SCNC: 3.6 MMOL/L (ref 3.5–5.1)
RBC # BLD AUTO: 3.4 M/UL (ref 3.8–5.2)
SERVICE CMNT-IMP: ABNORMAL
SODIUM SERPL-SCNC: 140 MMOL/L (ref 136–145)
WBC # BLD AUTO: 6.6 K/UL (ref 3.6–11)

## 2020-03-14 PROCEDURE — 74011250637 HC RX REV CODE- 250/637: Performed by: INTERNAL MEDICINE

## 2020-03-14 PROCEDURE — 80048 BASIC METABOLIC PNL TOTAL CA: CPT

## 2020-03-14 PROCEDURE — 83735 ASSAY OF MAGNESIUM: CPT

## 2020-03-14 PROCEDURE — 65270000032 HC RM SEMIPRIVATE

## 2020-03-14 PROCEDURE — 74011250636 HC RX REV CODE- 250/636: Performed by: INTERNAL MEDICINE

## 2020-03-14 PROCEDURE — 85027 COMPLETE CBC AUTOMATED: CPT

## 2020-03-14 PROCEDURE — 36415 COLL VENOUS BLD VENIPUNCTURE: CPT

## 2020-03-14 PROCEDURE — 82962 GLUCOSE BLOOD TEST: CPT

## 2020-03-14 PROCEDURE — 74011636637 HC RX REV CODE- 636/637: Performed by: INTERNAL MEDICINE

## 2020-03-14 PROCEDURE — 74011250637 HC RX REV CODE- 250/637: Performed by: NURSE PRACTITIONER

## 2020-03-14 PROCEDURE — 74011000258 HC RX REV CODE- 258: Performed by: INTERNAL MEDICINE

## 2020-03-14 RX ORDER — MORPHINE SULFATE 2 MG/ML
1 INJECTION, SOLUTION INTRAMUSCULAR; INTRAVENOUS
Status: DISCONTINUED | OUTPATIENT
Start: 2020-03-14 | End: 2020-03-18 | Stop reason: HOSPADM

## 2020-03-14 RX ORDER — ALPRAZOLAM 0.25 MG/1
0.25 TABLET ORAL 3 TIMES DAILY
Status: DISCONTINUED | OUTPATIENT
Start: 2020-03-14 | End: 2020-03-18 | Stop reason: HOSPADM

## 2020-03-14 RX ORDER — AMOXICILLIN AND CLAVULANATE POTASSIUM 875; 125 MG/1; MG/1
1 TABLET, FILM COATED ORAL EVERY 12 HOURS
Status: DISCONTINUED | OUTPATIENT
Start: 2020-03-14 | End: 2020-03-18 | Stop reason: HOSPADM

## 2020-03-14 RX ORDER — METRONIDAZOLE 250 MG/1
500 TABLET ORAL 2 TIMES DAILY
Status: DISCONTINUED | OUTPATIENT
Start: 2020-03-14 | End: 2020-03-18 | Stop reason: HOSPADM

## 2020-03-14 RX ORDER — HYDROCODONE BITARTRATE AND ACETAMINOPHEN 5; 325 MG/1; MG/1
1 TABLET ORAL
Status: DISCONTINUED | OUTPATIENT
Start: 2020-03-14 | End: 2020-03-18 | Stop reason: HOSPADM

## 2020-03-14 RX ADMIN — MICONAZOLE NITRATE 200 MG: 200 SUPPOSITORY VAGINAL at 21:56

## 2020-03-14 RX ADMIN — HYDROCODONE BITARTRATE AND ACETAMINOPHEN 1 TABLET: 5; 325 TABLET ORAL at 18:49

## 2020-03-14 RX ADMIN — INSULIN LISPRO 3 UNITS: 100 INJECTION, SOLUTION INTRAVENOUS; SUBCUTANEOUS at 12:43

## 2020-03-14 RX ADMIN — Medication 10 ML: at 21:06

## 2020-03-14 RX ADMIN — INSULIN LISPRO 2 UNITS: 100 INJECTION, SOLUTION INTRAVENOUS; SUBCUTANEOUS at 17:48

## 2020-03-14 RX ADMIN — ALPRAZOLAM 0.25 MG: 0.25 TABLET ORAL at 21:50

## 2020-03-14 RX ADMIN — HYDROCODONE BITARTRATE AND ACETAMINOPHEN 1 TABLET: 5; 325 TABLET ORAL at 12:37

## 2020-03-14 RX ADMIN — ENOXAPARIN SODIUM 40 MG: 40 INJECTION SUBCUTANEOUS at 10:27

## 2020-03-14 RX ADMIN — SODIUM CHLORIDE 3.38 G: 900 INJECTION, SOLUTION INTRAVENOUS at 13:30

## 2020-03-14 RX ADMIN — HYDROCODONE BITARTRATE AND ACETAMINOPHEN 1 TABLET: 5; 325 TABLET ORAL at 03:20

## 2020-03-14 RX ADMIN — SODIUM CHLORIDE 125 ML/HR: 900 INJECTION, SOLUTION INTRAVENOUS at 13:24

## 2020-03-14 RX ADMIN — INSULIN GLARGINE 19 UNITS: 100 INJECTION, SOLUTION SUBCUTANEOUS at 21:05

## 2020-03-14 RX ADMIN — ALPRAZOLAM 0.25 MG: 0.25 TABLET ORAL at 17:42

## 2020-03-14 RX ADMIN — AMOXICILLIN AND CLAVULANATE POTASSIUM 1 TABLET: 875; 125 TABLET, FILM COATED ORAL at 20:43

## 2020-03-14 RX ADMIN — IBUPROFEN 400 MG: 400 TABLET, FILM COATED ORAL at 20:43

## 2020-03-14 RX ADMIN — INSULIN LISPRO 4 UNITS: 100 INJECTION, SOLUTION INTRAVENOUS; SUBCUTANEOUS at 12:44

## 2020-03-14 RX ADMIN — INSULIN LISPRO 4 UNITS: 100 INJECTION, SOLUTION INTRAVENOUS; SUBCUTANEOUS at 17:48

## 2020-03-14 RX ADMIN — METRONIDAZOLE 500 MG: 250 TABLET ORAL at 17:42

## 2020-03-14 RX ADMIN — ALPRAZOLAM 0.25 MG: 0.25 TABLET ORAL at 10:27

## 2020-03-14 RX ADMIN — MORPHINE SULFATE 1 MG: 2 INJECTION, SOLUTION INTRAMUSCULAR; INTRAVENOUS at 15:48

## 2020-03-14 RX ADMIN — PANTOPRAZOLE SODIUM 40 MG: 40 TABLET, DELAYED RELEASE ORAL at 07:28

## 2020-03-14 RX ADMIN — SODIUM CHLORIDE 3.38 G: 900 INJECTION, SOLUTION INTRAVENOUS at 05:22

## 2020-03-14 RX ADMIN — Medication 10 ML: at 15:51

## 2020-03-14 NOTE — PROGRESS NOTES
[facility]                                                                                          Hospitalist Progress Note  Devan Rose MD  Answering service: [phone] OR [phone] from in house phone        Date of Service:  3/14/2020  NAME:  Lopez Deleon  :  1980  MRN:  [id number]      Admission Summary:   Lopez Deleon is a 44 y.o. female with known hx of DM1, discharged from [facility] on 3/11/20 after I&D for perineal abscesses, incisions on both buttocks, draining pus. Pt is homeless and was discharge to [address] [place] [address]. Pt appears sleepy, dozzing off during the conversation, seems like under the influence. Pt stated to use marijuana. Pt felt very weak and unsteady and could not get up and urinated on self, so called 911 for the help and was brought to the ED. Pt denied any fever, chills, abdominal pain, chest pain, SOB, palpitation, dizziness, lightheadedness, nausea, vomiting. Pt is legally blind in R eye. No visible defects noticeable. No other concerns. History was little limited due to patient condition. Interval history / Subjective: Follow up AMS, weakness, hyperglycemia, perineal abscesses s/p I&D at [facility] 3/11. Patient seen and examined at the bedside. Labs, images and notes reviewed  Discussed with nursing staff, orders reviewed. Plan discussed with patient/Family    Mentation is better. Able to converse well. Wound care ongoing, requiring frequent change of dressing and packing. Soakage persistent. BGs better controlled. Pain is limiting with dressing per nursing. No fever, chills. No overnight events. Assessment & Plan:     # AMS and weakness, likely THC + related  -Admit to inpatient, ALOS >2MN, medical floor. -UDS Noted + for THC  -DC IVF hydration with cleared mentation  -PT/OT  -Discussed the disposition with patient.     # Hyperglycemia in DM1 patient, likely related to noncompliance.  Better controlled  -Overall poorly controled with A1C 11.2  -NPH 15u once on admission  -Lantus 19u QHS. Seems to be working. Continue current mx and follow serial accuchecks closely  -DM mx consult, appreciate  -Humalog 4u TIDAC and SSI normal resistence     # Perineal abscess, s/p I&D at [facility] before DC on Clindamycin 450mg Q6hrs x 10 days on 3/11/20, likely noncompliant  -On Zosyn as initiated in ED.  -We will transition to p.o. Augmentin and metronidazole for 7 more days  -Was dc on Clindamycin. -GNS consult for reassessment, on board  -Wound care team on board  -Tylenol and NSAIDs for pain control  -Caution for opiate use given the AMS and concern for being under the influence   -We will use morphine 1 mg IV 15-30 minutes before dressing and wound packing on left buttock     # Hypotension, likely hypovolemia and dehydration related to hyperglycemia, resolved  -BP still soft  -We will DC IV fluids and watch closely  -May consider repeat Blood cultures if persists or SIRS bocome +     # Hypoxia, likely related to AMS and possible respiratory depression under the influence  -RA currently and breathing well  -Monitor closely     # Hx of Bahcet's disease  -Stable and not on any immunomodulating agent  -If any flare, would consider medrol dose ya or short burst     # Dehydration related to hyperglycemia, resolved  -Monitor off IVF     # Marijuana use  -UDS +  -Counseled for absteinence  -HIV screen per pt request, non-reactive     # Hematuria  -Active menstruation. No symptoms.  -No further evaluation at present     Emergency contact: Mother: Damian Belle, [phone].  Sister: Emma Schofield [phone]    Code status: Full  DVT prophylaxis: Lovenox SQ    Care Plan discussed with: Patient/Family, Nurse and Other Wound care  Anticipated Disposition: Homeless shelter [facility]  Anticipated Discharge: Likely 24 hours     Hospital Problems  Never Reviewed          Codes Class Noted POA    Hypoxia ICD-10-CM: R09.02  ICD-9-CM: 799.02  3/12/2020 Unknown        Perianal cellulitis ICD-10-CM: K61.0  ICD-9-CM: 252  3/12/2020 Unknown        Weakness ICD-10-CM: R53.1  ICD-9-CM: 780.79  3/12/2020 Unknown        Hyperglycemia ICD-10-CM: R73.9  ICD-9-CM: 790.29  3/12/2020 Unknown                Review of Systems:   A comprehensive review of systems was negative except for that written in the HPI. Vital Signs:    Last 24hrs VS reviewed since prior progress note. Most recent are:  Visit Vitals  BP 91/71 (BP 1 Location: Right arm, BP Patient Position: At rest)   Pulse 80   Temp 98.4 °F (36.9 °C)   Resp 18   Ht 5' 6\" (1.676 m)   Wt 97.5 kg (215 lb)   SpO2 100%   BMI 34.70 kg/m²         Intake/Output Summary (Last 24 hours) at 3/14/2020 8266  Last data filed at 3/13/2020 1015  Gross per 24 hour   Intake 480 ml   Output    Net 480 ml        Physical Examination:   General:          Alert, cooperative, no distress, appears stated age. Back to normal mentation   HEENT:           Atraumatic, anicteric sclerae, pink conjunctivae                          No oral ulcers, mucosa moist, throat clear, dentition fair  Neck:               Supple, symmetrical  Lungs:             Clear to auscultation bilaterally.  No Wheezing or Rhonchi. No rales. Chest wall:      No tenderness  No Accessory muscle use. Heart:              Regular  rhythm,  No  murmur   No edema  Abdomen:        Soft, non-tender. Not distended.  Bowel sounds normal. Perineal/ buttock   dressing in  place. Purulent soakage of her left side dressing. Extremities:     No cyanosis.  No clubbing,                            Skin turgor normal, Capillary refill normal  Skin:                Not pale.  Not Jaundiced  No rashes   Psych:             Not anxious or agitated.   Neurologic:      Alert, moves all extremities, answers questions appropriately and responds to   commands        Data Review:    Review and/or order of clinical lab test  Review and/or order of tests in the radiology section of CPT  Review and/or order of tests in the medicine section of CPT    Ct Head Wo Cont    Result Date: 3/12/2020  IMPRESSION: Unremarkable CT of the head. Ct Abd Pelv W Cont    Result Date: 3/12/2020  IMPRESSION: Perineal cellulitis without evidence of abscess. Xr Chest Port    Result Date: 3/12/2020  IMPRESSION: Normal chest.      Labs:     Recent Labs     03/13/20 0347 03/12/20  0305   WBC 7.1 9.5   HGB 9.0* 10.1*   HCT 28.7* 31.9*    300     Recent Labs     03/13/20 0347 03/12/20  0305   * 130*   K 3.2* 4.6   CL 99 96*   CO2 28 28   BUN 6 10   CREA 0.60 0.93   * 393*   CA 8.4* 8.5   MG  --  2.7*   PHOS  --  2.9     Recent Labs     03/13/20 0347 03/12/20  0305   SGOT 22 45*   ALT 17 25   AP 90 101   TBILI 0.3 0.5   TP 6.6 8.0   ALB 1.9* 2.3*   GLOB 4.7* 5.7*     No results for input(s): INR, PTP, APTT, INREXT, INREXT in the last 72 hours. No results for input(s): FE, TIBC, PSAT, FERR in the last 72 hours. No results found for: FOL, RBCF   No results for input(s): PH, PCO2, PO2 in the last 72 hours. No results for input(s): CPK, CKNDX, TROIQ in the last 72 hours.     No lab exists for component: CPKMB  No results found for: CHOL, CHOLX, CHLST, CHOLV, HDL, HDLP, LDL, LDLC, DLDLP, TGLX, TRIGL, TRIGP, CHHD, CHHDX  Lab Results   Component Value Date/Time    Glucose (POC) 143 (H) 03/14/2020 07:10 AM    Glucose (POC) 126 (H) 03/13/2020 09:17 PM    Glucose (POC) 118 (H) 03/13/2020 04:47 PM    Glucose (POC) 158 (H) 03/13/2020 11:17 AM    Glucose (POC) 248 (H) 03/13/2020 07:20 AM     Lab Results   Component Value Date/Time    Color PINK 03/12/2020 05:41 AM    Appearance CLEAR 03/12/2020 05:41 AM    Specific gravity <1.005 03/12/2020 05:41 AM    pH (UA) 6.0 03/12/2020 05:41 AM    Protein TRACE (A) 03/12/2020 05:41 AM    Glucose >1,000 (A) 03/12/2020 05:41 AM    Ketone NEGATIVE  03/12/2020 05:41 AM    Bilirubin NEGATIVE  03/12/2020 05:41 AM    Urobilinogen 1.0 03/12/2020 05:41 AM    Nitrites NEGATIVE  03/12/2020 05:41 AM Leukocyte Esterase NEGATIVE  03/12/2020 05:41 AM    Epithelial cells FEW 03/12/2020 05:41 AM    Bacteria NEGATIVE  03/12/2020 05:41 AM    WBC 0-4 03/12/2020 05:41 AM    RBC >100 (H) 03/12/2020 05:41 AM         Medications Reviewed:     Current Facility-Administered Medications   Medication Dose Route Frequency    HYDROcodone-acetaminophen (NORCO) 5-325 mg per tablet 1 Tab  1 Tab Oral Q8H PRN    sodium chloride (NS) flush 5-40 mL  5-40 mL IntraVENous PRN    insulin lispro (HUMALOG) injection   SubCUTAneous AC&HS    sodium chloride (NS) flush 5-40 mL  5-40 mL IntraVENous Q8H    sodium chloride (NS) flush 5-40 mL  5-40 mL IntraVENous PRN    acetaminophen (TYLENOL) tablet 650 mg  650 mg Oral Q4H PRN    ondansetron (ZOFRAN) injection 4 mg  4 mg IntraVENous Q4H PRN    ibuprofen (MOTRIN) tablet 400 mg  400 mg Oral Q6H PRN    enoxaparin (LOVENOX) injection 40 mg  40 mg SubCUTAneous Q24H    glucose chewable tablet 16 g  4 Tab Oral PRN    glucagon (GLUCAGEN) injection 1 mg  1 mg IntraMUSCular PRN    dextrose 10% infusion 0-250 mL  0-250 mL IntraVENous PRN    insulin glargine (LANTUS) injection 19 Units  0.2 Units/kg SubCUTAneous QHS    0.9% sodium chloride infusion  125 mL/hr IntraVENous CONTINUOUS    nicotine (NICODERM CQ) 21 mg/24 hr patch 1 Patch  1 Patch TransDERmal Q24H    piperacillin-tazobactam (ZOSYN) 3.375 g in 0.9% sodium chloride (MBP/ADV) 100 mL  3.375 g IntraVENous Q8H    insulin lispro (HUMALOG) injection 4 Units  4 Units SubCUTAneous TIDAC    pantoprazole (PROTONIX) tablet 40 mg  40 mg Oral ACB    ALPRAZolam (XANAX) tablet 0.25 mg  0.25 mg Oral BID     ______________________________________________________________________  EXPECTED LENGTH OF STAY: 2d 21h  ACTUAL LENGTH OF STAY:          2                 Kristinor MD Kane

## 2020-03-14 NOTE — PROGRESS NOTES
[facility] at [facility]        Subjective     No acute issues, some buttock pain    Objective     Patient Vitals for the past 24 hrs:   Temp Pulse Resp BP SpO2   03/14/20 0745 98.4 °F (36.9 °C) 80 18 91/71 100 %   03/14/20 0424 97.9 °F (36.6 °C) 79 18 93/62 96 %   03/13/20 2248     94 %   03/13/20 2057 97.9 °F (36.6 °C) 95 18 97/63 94 %   03/13/20 1434 97.8 °F (36.6 °C) 86 18 102/70 94 %       Date 03/13/20 0700 - 03/14/20 0659 03/14/20 0700 - 03/15/20 0659   Shift 4590-6541 1110-5786 24 Hour Total 6583-1844 1370-3370 24 Hour Total   INTAKE   P.O. 480  480        P. O. 480  480      Shift Total(mL/kg) 480(4.9)  480(4.9)      OUTPUT   Shift Total(mL/kg)           480      Weight (kg) 97.7 97.5 97.5 97.5 97.5 97.5       PE  Pulm - CTAB  CV - RRR  BUttocks - R buttock wound with no drainage, soft and minimal induration, L buttock with some pururlent drainage, more TTP, no erythema mild induration, no undrained pockets with the wound explored at bedside    Labs  Recent Results (from the past 12 hour(s))   GLUCOSE, POC    Collection Time: 03/14/20  7:10 AM   Result Value Ref Range    Glucose (POC) 143 (H) 65 - 100 mg/dL    Performed by Vane Hodgson is a 44 y. o.yr old female with bilateral buttock abscesses    Plan     The L buttock wound still has some purulent drainage and needs to be packed DEEPLY 2 times a day, I chnaged the dressing myself, she did not tolerate the dressing change well and will add some IV pain medication for the changes  Not ready for DC home from the L buttock wound perspective. No need for operative drainage, just increase the dressig change frequency and instructed the nurses to pack the wound deep.     R buttock would looks better, no drainage, continue daily dressing changes    Continue IV abx    Paula Santamaria MD

## 2020-03-14 NOTE — PROGRESS NOTES
CM noted consult for dispo planning- pt homeless. See CM note from yesterday indicating process for placement [facility]. Consult completed.     Daniel RUEDAW, ACM

## 2020-03-14 NOTE — PROGRESS NOTES
Clinical Pharmacy Note: Metronidazole Dosing    Please note that the metronidazole dose for Giacomo Boone has been changed to 500 mg q12h per [facility]-approved protocol. Please contact the pharmacy with any questions.     Liudmila Salazar, [facility]

## 2020-03-14 NOTE — ROUTINE PROCESS
Bedside shift change report given to kayla fuchs rn (oncoming nurse) by Preet Arias (offgoing nurse). Report included the following information SBAR and Kardex.

## 2020-03-14 NOTE — PROGRESS NOTES
Bedside shift change report given to [facility] RN (oncoming nurse) by Delfino Coffey RN (offgoing nurse). Report included the following information SBAR, MAR and Recent Results.

## 2020-03-15 LAB
GLUCOSE BLD STRIP.AUTO-MCNC: 130 MG/DL (ref 65–100)
GLUCOSE BLD STRIP.AUTO-MCNC: 135 MG/DL (ref 65–100)
GLUCOSE BLD STRIP.AUTO-MCNC: 137 MG/DL (ref 65–100)
GLUCOSE BLD STRIP.AUTO-MCNC: 141 MG/DL (ref 65–100)
SERVICE CMNT-IMP: ABNORMAL

## 2020-03-15 PROCEDURE — 74011250637 HC RX REV CODE- 250/637: Performed by: INTERNAL MEDICINE

## 2020-03-15 PROCEDURE — 65270000032 HC RM SEMIPRIVATE

## 2020-03-15 PROCEDURE — 74011636637 HC RX REV CODE- 636/637: Performed by: INTERNAL MEDICINE

## 2020-03-15 PROCEDURE — 74011250636 HC RX REV CODE- 250/636: Performed by: NURSE PRACTITIONER

## 2020-03-15 PROCEDURE — 74011250637 HC RX REV CODE- 250/637: Performed by: NURSE PRACTITIONER

## 2020-03-15 PROCEDURE — 82962 GLUCOSE BLOOD TEST: CPT

## 2020-03-15 PROCEDURE — 74011250636 HC RX REV CODE- 250/636: Performed by: INTERNAL MEDICINE

## 2020-03-15 RX ORDER — HYDROMORPHONE HYDROCHLORIDE 1 MG/ML
1 INJECTION, SOLUTION INTRAMUSCULAR; INTRAVENOUS; SUBCUTANEOUS 2 TIMES DAILY
Status: DISCONTINUED | OUTPATIENT
Start: 2020-03-15 | End: 2020-03-16

## 2020-03-15 RX ORDER — HYDROMORPHONE HYDROCHLORIDE 1 MG/ML
1 INJECTION, SOLUTION INTRAMUSCULAR; INTRAVENOUS; SUBCUTANEOUS DAILY
Status: DISCONTINUED | OUTPATIENT
Start: 2020-03-15 | End: 2020-03-15

## 2020-03-15 RX ADMIN — INSULIN LISPRO 4 UNITS: 100 INJECTION, SOLUTION INTRAVENOUS; SUBCUTANEOUS at 07:27

## 2020-03-15 RX ADMIN — ALPRAZOLAM 0.25 MG: 0.25 TABLET ORAL at 15:33

## 2020-03-15 RX ADMIN — HYDROMORPHONE HYDROCHLORIDE 1 MG: 1 INJECTION, SOLUTION INTRAMUSCULAR; INTRAVENOUS; SUBCUTANEOUS at 21:12

## 2020-03-15 RX ADMIN — AMOXICILLIN AND CLAVULANATE POTASSIUM 1 TABLET: 875; 125 TABLET, FILM COATED ORAL at 09:00

## 2020-03-15 RX ADMIN — HYDROCODONE BITARTRATE AND ACETAMINOPHEN 1 TABLET: 5; 325 TABLET ORAL at 17:27

## 2020-03-15 RX ADMIN — HYDROCODONE BITARTRATE AND ACETAMINOPHEN 1 TABLET: 5; 325 TABLET ORAL at 02:57

## 2020-03-15 RX ADMIN — AMOXICILLIN AND CLAVULANATE POTASSIUM 1 TABLET: 875; 125 TABLET, FILM COATED ORAL at 21:12

## 2020-03-15 RX ADMIN — Medication 10 ML: at 21:17

## 2020-03-15 RX ADMIN — HYDROMORPHONE HYDROCHLORIDE 1 MG: 1 INJECTION, SOLUTION INTRAMUSCULAR; INTRAVENOUS; SUBCUTANEOUS at 09:01

## 2020-03-15 RX ADMIN — ENOXAPARIN SODIUM 40 MG: 40 INJECTION SUBCUTANEOUS at 09:00

## 2020-03-15 RX ADMIN — Medication 10 ML: at 07:13

## 2020-03-15 RX ADMIN — Medication 10 ML: at 13:59

## 2020-03-15 RX ADMIN — INSULIN LISPRO 4 UNITS: 100 INJECTION, SOLUTION INTRAVENOUS; SUBCUTANEOUS at 17:27

## 2020-03-15 RX ADMIN — METRONIDAZOLE 500 MG: 250 TABLET ORAL at 08:59

## 2020-03-15 RX ADMIN — PANTOPRAZOLE SODIUM 40 MG: 40 TABLET, DELAYED RELEASE ORAL at 07:13

## 2020-03-15 RX ADMIN — ALPRAZOLAM 0.25 MG: 0.25 TABLET ORAL at 09:00

## 2020-03-15 RX ADMIN — INSULIN LISPRO 4 UNITS: 100 INJECTION, SOLUTION INTRAVENOUS; SUBCUTANEOUS at 13:59

## 2020-03-15 RX ADMIN — METRONIDAZOLE 500 MG: 250 TABLET ORAL at 16:56

## 2020-03-15 RX ADMIN — MICONAZOLE NITRATE 200 MG: 200 SUPPOSITORY VAGINAL at 21:12

## 2020-03-15 RX ADMIN — PANTOPRAZOLE SODIUM 40 MG: 40 TABLET, DELAYED RELEASE ORAL at 07:28

## 2020-03-15 RX ADMIN — MORPHINE SULFATE 1 MG: 2 INJECTION, SOLUTION INTRAMUSCULAR; INTRAVENOUS at 03:58

## 2020-03-15 RX ADMIN — ALPRAZOLAM 0.25 MG: 0.25 TABLET ORAL at 21:12

## 2020-03-15 RX ADMIN — INSULIN GLARGINE 19 UNITS: 100 INJECTION, SOLUTION SUBCUTANEOUS at 21:17

## 2020-03-15 RX ADMIN — MORPHINE SULFATE 1 MG: 2 INJECTION, SOLUTION INTRAMUSCULAR; INTRAVENOUS at 18:08

## 2020-03-15 NOTE — PROGRESS NOTES
Ms. Curt Page has no complaints this afternoon. Feeling better. Tm 98.6 HR: 63 BP: 105/71 Resp Rate: 16 95% sat on room air. Intake/Output Summary (Last 24 hours) at 3/15/2020 1238  Last data filed at 3/14/2020 1320  Gross per 24 hour   Intake 240 ml   Output    Net 240 ml   Exam: Cor: RRR. Lungs: Bilateral breath sounds. Clear to auscultation. Abd: Soft. Non tender. Buttock Wounds: Dressed. Labs:   Recent Results (from the past 12 hour(s))   GLUCOSE, POC    Collection Time: 03/15/20  7:15 AM   Result Value Ref Range    Glucose (POC) 137 (H) 65 - 100 mg/dL    Performed by Fani Horne    GLUCOSE, POC    Collection Time: 03/15/20 11:16 AM   Result Value Ref Range    Glucose (POC) 135 (H) 65 - 100 mg/dL    Performed by [employer]    Continue local wound care. Diet as tolerated. Oral abx - Augmentin and Flagyl. Pain medication as needed. OOB, Ambulate. Plans per Dr. Ayesha Silver.

## 2020-03-15 NOTE — PROGRESS NOTES
Bedside shift change report given to cary (oncoming nurse) by Roselyn Uriarte (offgoing nurse). Report included the following information SBAR, Kardex and MAR.

## 2020-03-15 NOTE — ROUTINE PROCESS
Bedside shift change report given to kayla fuchs rn (oncoming nurse) by cary rn (offgoing nurse). Report included the following information SBAR and Kardex.

## 2020-03-15 NOTE — PROGRESS NOTES
General Surgery Daily Progress Note    Admit Date: 3/12/2020  Post-Operative Day: * No surgery found * from * No surgery found *     Subjective:     Last 24 hrs: Pt c/o some buttock pain. No fevers. Medicated w/ 1mg dilaudid prior to wound care    Objective:     Blood pressure 105/71, pulse 63, temperature 97.6 °F (36.4 °C), resp. rate 16, height 5' 6\" (1.676 m), weight 225 lb 15.5 oz (102.5 kg), last menstrual period 2020, SpO2 95 %. Temp (24hrs), Av °F (36.7 °C), Min:97.6 °F (36.4 °C), Max:98.6 °F (37 °C)      _____________________  Physical Exam:     Alert and Oriented, x3, in no acute distress. Cardiovascular: RRR, no peripheral edema  R buttock w/ sm amt induration, no drainage - packed w/ sm amt mesalt  L buttock wound w/ sm amt induration - some purulent drainage - packed deep w/ mesalt    Assessment:   Active Problems:    Hypoxia (3/12/2020)      Perianal cellulitis (3/12/2020)      Weakness (3/12/2020)      Hyperglycemia (3/12/2020)            Plan:     Cont LWC BID  Dilaudid 1mg IV prior to both drsg changes  Cont abx    Data Review:    Recent Labs     20  1552 20  0347   WBC 6.6 7.1   HGB 8.8* 9.0*   HCT 28.8* 28.7*    334     Recent Labs     20  1552 20  0347    134*   K 3.6 3.2*    99   CO2 27 28   * 251*   BUN 2* 6   CREA 0.66 0.60   CA 8.2* 8.4*   MG 1.7  --    ALB  --  1.9*   SGOT  --  22   ALT  --  17     No results for input(s): AML, LPSE in the last 72 hours.         ______________________  Medications:    Current Facility-Administered Medications   Medication Dose Route Frequency    HYDROmorphone (PF) (DILAUDID) injection 1 mg  1 mg IntraVENous DAILY    HYDROcodone-acetaminophen (NORCO) 5-325 mg per tablet 1 Tab  1 Tab Oral Q8H PRN    morphine injection 1 mg  1 mg IntraVENous BID PRN    amoxicillin-clavulanate (AUGMENTIN) 875-125 mg per tablet 1 Tab  1 Tab Oral Q12H    metroNIDAZOLE (FLAGYL) tablet 500 mg  500 mg Oral BID    miconazole (MICOTIN) 200 mg vaginal suppository 200 mg  200 mg Vaginal QHS    ALPRAZolam (XANAX) tablet 0.25 mg  0.25 mg Oral TID    sodium chloride (NS) flush 5-40 mL  5-40 mL IntraVENous PRN    insulin lispro (HUMALOG) injection   SubCUTAneous AC&HS    sodium chloride (NS) flush 5-40 mL  5-40 mL IntraVENous Q8H    sodium chloride (NS) flush 5-40 mL  5-40 mL IntraVENous PRN    acetaminophen (TYLENOL) tablet 650 mg  650 mg Oral Q4H PRN    ondansetron (ZOFRAN) injection 4 mg  4 mg IntraVENous Q4H PRN    ibuprofen (MOTRIN) tablet 400 mg  400 mg Oral Q6H PRN    enoxaparin (LOVENOX) injection 40 mg  40 mg SubCUTAneous Q24H    glucose chewable tablet 16 g  4 Tab Oral PRN    glucagon (GLUCAGEN) injection 1 mg  1 mg IntraMUSCular PRN    dextrose 10% infusion 0-250 mL  0-250 mL IntraVENous PRN    insulin glargine (LANTUS) injection 19 Units  0.2 Units/kg SubCUTAneous QHS    nicotine (NICODERM CQ) 21 mg/24 hr patch 1 Patch  1 Patch TransDERmal Q24H    insulin lispro (HUMALOG) injection 4 Units  4 Units SubCUTAneous TIDAC    pantoprazole (PROTONIX) tablet 40 mg  40 mg Oral ANABELL Wright NP  3/15/2020

## 2020-03-15 NOTE — PROGRESS NOTES
Problem: Pain  Goal: *Control of Pain  Outcome: Progressing Towards Goal     Problem: Pain  Goal: *Control of Pain  Outcome: Progressing Towards Goal     Problem: Patient Education: Go to Patient Education Activity  Goal: Patient/Family Education  Outcome: Progressing Towards Goal

## 2020-03-15 NOTE — PROGRESS NOTES
Bedside and Verbal shift change report given to Davis (oncoming nurse) by Janis Granados (offgoing nurse). Report included the following information SBAR, Kardex and MAR.

## 2020-03-15 NOTE — PROGRESS NOTES
Problem: Diabetes Self-Management  Goal: *Disease process and treatment process  Description: Define diabetes and identify own type of diabetes; list 3 options for treating diabetes. Outcome: Progressing Towards Goal     Problem: Falls - Risk of  Goal: *Absence of Falls  Description: Document Cherry Hover Fall Risk and appropriate interventions in the flowsheet.   Outcome: Progressing Towards Goal  Note: Fall Risk Interventions:  Mobility Interventions: Communicate number of staff needed for ambulation/transfer    Mentation Interventions: Adequate sleep, hydration, pain control, Evaluate medications/consider consulting pharmacy    Medication Interventions: Bed/chair exit alarm, Patient to call before getting OOB    Elimination Interventions: Call light in reach              Problem: Pain  Goal: *Control of Pain  Outcome: Progressing Towards Goal

## 2020-03-15 NOTE — PROGRESS NOTES
[facility] [facility] Adult  Hospitalist Group                                                                                          Hospitalist Progress Note  Padmini Antunez MD  Answering service: [phone] OR [phone] from in house phone        Date of Service:  3/15/2020  NAME:  Abhi Sommer  :  1980  MRN:  [id number]      Admission Summary:   Abhi Sommer is a 44 y.o. female with known hx of DM1, discharged from [facility] on 3/11/20 after I&D for perineal abscesses, incisions on both buttocks, draining pus. Pt is homeless and was discharge to [address] [place] [address]. Pt appears sleepy, dozzing off during the conversation, seems like under the influence. Pt stated to use marijuana. Pt felt very weak and unsteady and could not get up and urinated on self, so called 911 for the help and was brought to the ED. Pt denied any fever, chills, abdominal pain, chest pain, SOB, palpitation, dizziness, lightheadedness, nausea, vomiting. Pt is legally blind in R eye. No visible defects noticeable. No other concerns. History was little limited due to patient condition. Interval history / Subjective: Follow up AMS, weakness, hyperglycemia, perineal abscesses s/p I&D at [facility] 3/11. Patient seen and examined at the bedside. Labs, images and notes reviewed  Discussed with nursing staff, orders reviewed. Plan discussed with patient/Family    Mentation is better. Feeling well. Pain controlled. Morphin IV before dressings. No N/V. Some ambulation difficulties given location of the lesions/abscesses, I&D and dressings. No overnight events or concerns. GNS ok with current care, PO abx change and dressing. [facility] for DC as well as it seems like. Assessment & Plan:     # AMS and weakness, likely THC + related, resolved and at baseline.  -Admit to inpatient, ALOS >2MN, medical floor. -UDS Noted + for THC  -DC IVF hydration with cleared mentation  -PT/OT  -Discussed the disposition with patient.  When CM can arrange, [facility] to [facility], as pt is medically stable for DC     # Hyperglycemia in DM1 patient, likely related to noncompliance. well controlled  -Overall poorly controled with A1C 11.2  -NPH 15u once on admission, less sufficient  -Lantus 19u QHS. Working well in keeping BGs in goal range.  -Serial accuchecks ACHS  -DM mx consult, appreciate  -Humalog 4u TIDAC and SSI normal resistence     # Perineal abscess, s/p I&D at [facility] before DC on Clindamycin 450mg Q6hrs x 10 days on 3/11/20, likely noncompliant  -On Zosyn as initiated in ED.  -Continue Augmentin and metronidazole till 3/20/20  -Was dc on Clindamycin from [facility] before admission. -GNS on board  -Wound care team on board  -Tylenol and NSAIDs for pain control  -Continue morphine 1 mg IV 15-30 minutes before dressing while wound packing is needed.  -No opiates on DC     # Hypotension, likely hypovolemia and dehydration related to hyperglycemia, resolved  -BP still soft  -Close monitoring. Pt is not symptomatic. Has intermittent opiates with dressing     # Hypoxia, likely related to AMS and possible respiratory depression under the influence  -RA currently and breathing well  -Monitor closely     # Hx of Bahcet's disease  -Stable and not on any immunomodulating agent  -If any flare, would consider medrol dose ya or short burst     # Dehydration related to hyperglycemia, resolved  -Monitor off IVF     # Marijuana use  -UDS +  -Counseled for absteinence  -HIV screen per pt request, non-reactive     # Hematuria  -Active menstruation. No symptoms.  -No further evaluation at present     Emergency contact: Mother: Stalin Montemayor, [phone]. Sister: Jose Nugent [phone]    Code status: Full  DVT prophylaxis: Lovenox SQ    Care Plan discussed with: Patient/Family, Nurse and Other Wound care  Anticipated Disposition: Homeless shelter [facility]. Ready for DC  Anticipated Discharge: When disposition arranged.  Medically stable for [place] Problems  Never Reviewed Codes Class Noted POA    Hypoxia ICD-10-CM: R09.02  ICD-9-CM: 799.02  3/12/2020 Unknown        Perianal cellulitis ICD-10-CM: K61.0  ICD-9-CM: 116  3/12/2020 Unknown        Weakness ICD-10-CM: R53.1  ICD-9-CM: 780.79  3/12/2020 Unknown        Hyperglycemia ICD-10-CM: R73.9  ICD-9-CM: 790.29  3/12/2020 Unknown                Review of Systems:   A comprehensive review of systems was negative except for that written in the HPI. Vital Signs:    Last 24hrs VS reviewed since prior progress note. Most recent are:  Visit Vitals  /71 (BP 1 Location: Right arm, BP Patient Position: At rest;Lying left side)   Pulse 63   Temp 97.6 °F (36.4 °C)   Resp 16   Ht 5' 6\" (1.676 m)   Wt 102.5 kg (225 lb 15.5 oz)   SpO2 95%   BMI 36.47 kg/m²       No intake or output data in the 24 hours ending 03/15/20 1325     Physical Examination:   General:          Alert, cooperative, no distress, appears stated age. Back to normal mentation   HEENT:           Atraumatic, anicteric sclerae, pink conjunctivae                          No oral ulcers, mucosa moist, throat clear, dentition fair  Neck:               Supple, symmetrical  Lungs:             Clear to auscultation bilaterally.  No Wheezing or Rhonchi. No rales. Chest wall:      No tenderness  No Accessory muscle use. Heart:              Regular  rhythm,  No  murmur   No edema  Abdomen:        Soft, non-tender. Not distended.  Bowel sounds normal. Perineal/ buttock   dressing in  place. Purulent soakage of her left side dressing. Extremities:     No cyanosis.  No clubbing,                            Skin turgor normal, Capillary refill normal  Skin:                Not pale.  Not Jaundiced  No rashes   Psych:             Not anxious or agitated.   Neurologic:      Alert, moves all extremities, answers questions appropriately and responds to   commands        Data Review:    Review and/or order of clinical lab test  Review and/or order of tests in the radiology section of CPT  Review and/or order of tests in the medicine section of CPT    Ct Head Wo Cont    Result Date: 3/12/2020  IMPRESSION: Unremarkable CT of the head. Ct Abd Pelv W Cont    Result Date: 3/12/2020  IMPRESSION: Perineal cellulitis without evidence of abscess. Xr Chest Port    Result Date: 3/12/2020  IMPRESSION: Normal chest.      Labs:     Recent Labs     03/14/20  1552 03/13/20  0347   WBC 6.6 7.1   HGB 8.8* 9.0*   HCT 28.8* 28.7*    334     Recent Labs     03/14/20  1552 03/13/20  0347    134*   K 3.6 3.2*    99   CO2 27 28   BUN 2* 6   CREA 0.66 0.60   * 251*   CA 8.2* 8.4*   MG 1.7  --      Recent Labs     03/13/20 0347   SGOT 22   ALT 17   AP 90   TBILI 0.3   TP 6.6   ALB 1.9*   GLOB 4.7*     No results for input(s): INR, PTP, APTT, INREXT, INREXT in the last 72 hours. No results for input(s): FE, TIBC, PSAT, FERR in the last 72 hours. No results found for: FOL, RBCF   No results for input(s): PH, PCO2, PO2 in the last 72 hours. No results for input(s): CPK, CKNDX, TROIQ in the last 72 hours.     No lab exists for component: CPKMB  No results found for: CHOL, CHOLX, CHLST, CHOLV, HDL, HDLP, LDL, LDLC, DLDLP, TGLX, TRIGL, TRIGP, CHHD, CHHDX  Lab Results   Component Value Date/Time    Glucose (POC) 135 (H) 03/15/2020 11:16 AM    Glucose (POC) 137 (H) 03/15/2020 07:15 AM    Glucose (POC) 167 (H) 03/14/2020 09:00 PM    Glucose (POC) 141 (H) 03/14/2020 04:24 PM    Glucose (POC) 202 (H) 03/14/2020 11:33 AM     Lab Results   Component Value Date/Time    Color PINK 03/12/2020 05:41 AM    Appearance CLEAR 03/12/2020 05:41 AM    Specific gravity <1.005 03/12/2020 05:41 AM    pH (UA) 6.0 03/12/2020 05:41 AM    Protein TRACE (A) 03/12/2020 05:41 AM    Glucose >1,000 (A) 03/12/2020 05:41 AM    Ketone NEGATIVE  03/12/2020 05:41 AM    Bilirubin NEGATIVE  03/12/2020 05:41 AM    Urobilinogen 1.0 03/12/2020 05:41 AM    Nitrites NEGATIVE  03/12/2020 05:41 AM    Leukocyte Esterase NEGATIVE 03/12/2020 05:41 AM    Epithelial cells FEW 03/12/2020 05:41 AM    Bacteria NEGATIVE  03/12/2020 05:41 AM    WBC 0-4 03/12/2020 05:41 AM    RBC >100 (H) 03/12/2020 05:41 AM         Medications Reviewed:     Current Facility-Administered Medications   Medication Dose Route Frequency    HYDROmorphone (PF) (DILAUDID) injection 1 mg  1 mg IntraVENous BID    HYDROcodone-acetaminophen (NORCO) 5-325 mg per tablet 1 Tab  1 Tab Oral Q8H PRN    morphine injection 1 mg  1 mg IntraVENous BID PRN    amoxicillin-clavulanate (AUGMENTIN) 875-125 mg per tablet 1 Tab  1 Tab Oral Q12H    metroNIDAZOLE (FLAGYL) tablet 500 mg  500 mg Oral BID    miconazole (MICOTIN) 200 mg vaginal suppository 200 mg  200 mg Vaginal QHS    ALPRAZolam (XANAX) tablet 0.25 mg  0.25 mg Oral TID    sodium chloride (NS) flush 5-40 mL  5-40 mL IntraVENous PRN    insulin lispro (HUMALOG) injection   SubCUTAneous AC&HS    sodium chloride (NS) flush 5-40 mL  5-40 mL IntraVENous Q8H    sodium chloride (NS) flush 5-40 mL  5-40 mL IntraVENous PRN    acetaminophen (TYLENOL) tablet 650 mg  650 mg Oral Q4H PRN    ondansetron (ZOFRAN) injection 4 mg  4 mg IntraVENous Q4H PRN    ibuprofen (MOTRIN) tablet 400 mg  400 mg Oral Q6H PRN    enoxaparin (LOVENOX) injection 40 mg  40 mg SubCUTAneous Q24H    glucose chewable tablet 16 g  4 Tab Oral PRN    glucagon (GLUCAGEN) injection 1 mg  1 mg IntraMUSCular PRN    dextrose 10% infusion 0-250 mL  0-250 mL IntraVENous PRN    insulin glargine (LANTUS) injection 19 Units  0.2 Units/kg SubCUTAneous QHS    nicotine (NICODERM CQ) 21 mg/24 hr patch 1 Patch  1 Patch TransDERmal Q24H    insulin lispro (HUMALOG) injection 4 Units  4 Units SubCUTAneous TIDAC    pantoprazole (PROTONIX) tablet 40 mg  40 mg Oral ACB     ______________________________________________________________________  EXPECTED LENGTH OF STAY: 2d 21h  ACTUAL LENGTH OF STAY:          3                 Osvaldo Silvestre MD

## 2020-03-15 NOTE — PROGRESS NOTES
Bedside shift change report given to Mamta Nunes (oncoming nurse) by Rebecca Ruiz RN (offgoing nurse). Report included the following information SBAR, Kardex, MAR and Recent Results.

## 2020-03-16 PROBLEM — R09.02 HYPOXIA: Status: RESOLVED | Noted: 2020-03-12 | Resolved: 2020-03-16

## 2020-03-16 LAB
ANION GAP SERPL CALC-SCNC: 5 MMOL/L (ref 5–15)
BUN SERPL-MCNC: 4 MG/DL (ref 6–20)
BUN/CREAT SERPL: 7 (ref 12–20)
CALCIUM SERPL-MCNC: 8.7 MG/DL (ref 8.5–10.1)
CHLORIDE SERPL-SCNC: 104 MMOL/L (ref 97–108)
CO2 SERPL-SCNC: 28 MMOL/L (ref 21–32)
CREAT SERPL-MCNC: 0.59 MG/DL (ref 0.55–1.02)
ERYTHROCYTE [DISTWIDTH] IN BLOOD BY AUTOMATED COUNT: 17.2 % (ref 11.5–14.5)
GLUCOSE BLD STRIP.AUTO-MCNC: 134 MG/DL (ref 65–100)
GLUCOSE BLD STRIP.AUTO-MCNC: 154 MG/DL (ref 65–100)
GLUCOSE BLD STRIP.AUTO-MCNC: 180 MG/DL (ref 65–100)
GLUCOSE BLD STRIP.AUTO-MCNC: 188 MG/DL (ref 65–100)
GLUCOSE BLD STRIP.AUTO-MCNC: 214 MG/DL (ref 65–100)
GLUCOSE SERPL-MCNC: 128 MG/DL (ref 65–100)
HCT VFR BLD AUTO: 30.6 % (ref 35–47)
HGB BLD-MCNC: 9.4 G/DL (ref 11.5–16)
MAGNESIUM SERPL-MCNC: 1.7 MG/DL (ref 1.6–2.4)
MCH RBC QN AUTO: 25.5 PG (ref 26–34)
MCHC RBC AUTO-ENTMCNC: 30.7 G/DL (ref 30–36.5)
MCV RBC AUTO: 83.2 FL (ref 80–99)
NRBC # BLD: 0.02 K/UL (ref 0–0.01)
NRBC BLD-RTO: 0.2 PER 100 WBC
PLATELET # BLD AUTO: 405 K/UL (ref 150–400)
PMV BLD AUTO: 9.5 FL (ref 8.9–12.9)
POTASSIUM SERPL-SCNC: 4.1 MMOL/L (ref 3.5–5.1)
RBC # BLD AUTO: 3.68 M/UL (ref 3.8–5.2)
SERVICE CMNT-IMP: ABNORMAL
SODIUM SERPL-SCNC: 137 MMOL/L (ref 136–145)
WBC # BLD AUTO: 8.8 K/UL (ref 3.6–11)

## 2020-03-16 PROCEDURE — 74011250637 HC RX REV CODE- 250/637: Performed by: NURSE PRACTITIONER

## 2020-03-16 PROCEDURE — 85027 COMPLETE CBC AUTOMATED: CPT

## 2020-03-16 PROCEDURE — 74011250636 HC RX REV CODE- 250/636: Performed by: INTERNAL MEDICINE

## 2020-03-16 PROCEDURE — 97530 THERAPEUTIC ACTIVITIES: CPT

## 2020-03-16 PROCEDURE — 36415 COLL VENOUS BLD VENIPUNCTURE: CPT

## 2020-03-16 PROCEDURE — 74011250637 HC RX REV CODE- 250/637: Performed by: INTERNAL MEDICINE

## 2020-03-16 PROCEDURE — 82962 GLUCOSE BLOOD TEST: CPT

## 2020-03-16 PROCEDURE — 80048 BASIC METABOLIC PNL TOTAL CA: CPT

## 2020-03-16 PROCEDURE — 65270000032 HC RM SEMIPRIVATE

## 2020-03-16 PROCEDURE — 83735 ASSAY OF MAGNESIUM: CPT

## 2020-03-16 PROCEDURE — 74011636637 HC RX REV CODE- 636/637: Performed by: INTERNAL MEDICINE

## 2020-03-16 PROCEDURE — 74011250636 HC RX REV CODE- 250/636: Performed by: NURSE PRACTITIONER

## 2020-03-16 PROCEDURE — 94760 N-INVAS EAR/PLS OXIMETRY 1: CPT

## 2020-03-16 RX ORDER — SACUBITRIL AND VALSARTAN 24; 26 MG/1; MG/1
1 TABLET, FILM COATED ORAL 2 TIMES DAILY
Qty: 60 TAB | Refills: 1 | Status: SHIPPED | OUTPATIENT
Start: 2020-03-16 | End: 2020-03-20

## 2020-03-16 RX ORDER — AMOXICILLIN 250 MG
1 CAPSULE ORAL
Qty: 30 TAB | Refills: 0 | Status: ON HOLD | OUTPATIENT
Start: 2020-03-16 | End: 2020-03-20

## 2020-03-16 RX ORDER — ERGOCALCIFEROL 1.25 MG/1
50000 CAPSULE ORAL
Qty: 5 CAP | Refills: 1 | Status: SHIPPED | OUTPATIENT
Start: 2020-03-16 | End: 2020-03-20

## 2020-03-16 RX ORDER — METOPROLOL SUCCINATE 50 MG/1
50 TABLET, EXTENDED RELEASE ORAL DAILY
Qty: 30 TAB | Refills: 1 | Status: SHIPPED | OUTPATIENT
Start: 2020-03-16 | End: 2020-03-20

## 2020-03-16 RX ORDER — POLYETHYLENE GLYCOL 3350 17 G/17G
17 POWDER, FOR SOLUTION ORAL
Qty: 30 PACKET | Refills: 1 | Status: ON HOLD | OUTPATIENT
Start: 2020-03-16 | End: 2020-03-20

## 2020-03-16 RX ORDER — IBUPROFEN 400 MG/1
400 TABLET ORAL
Qty: 90 TAB | Refills: 0 | Status: ON HOLD | OUTPATIENT
Start: 2020-03-16 | End: 2020-03-20

## 2020-03-16 RX ORDER — ACETAMINOPHEN 325 MG/1
650 TABLET ORAL
Qty: 120 TAB | Refills: 2 | Status: ON HOLD | OUTPATIENT
Start: 2020-03-16 | End: 2020-03-20

## 2020-03-16 RX ORDER — AMOXICILLIN AND CLAVULANATE POTASSIUM 875; 125 MG/1; MG/1
1 TABLET, FILM COATED ORAL EVERY 12 HOURS
Qty: 9 TAB | Refills: 0 | Status: ON HOLD | OUTPATIENT
Start: 2020-03-16 | End: 2020-03-20

## 2020-03-16 RX ORDER — CITALOPRAM 20 MG/1
20 TABLET, FILM COATED ORAL DAILY
Qty: 30 TAB | Refills: 1 | Status: SHIPPED | OUTPATIENT
Start: 2020-03-16 | End: 2020-03-20

## 2020-03-16 RX ORDER — METRONIDAZOLE 500 MG/1
500 TABLET ORAL 2 TIMES DAILY
Qty: 9 TAB | Refills: 0 | Status: ON HOLD | OUTPATIENT
Start: 2020-03-16 | End: 2020-03-20

## 2020-03-16 RX ORDER — ALPRAZOLAM 0.5 MG/1
0.5 TABLET ORAL
Qty: 60 TAB | Refills: 1 | Status: SHIPPED | OUTPATIENT
Start: 2020-03-16 | End: 2021-06-15

## 2020-03-16 RX ORDER — MAGNESIUM SULFATE HEPTAHYDRATE 40 MG/ML
2 INJECTION, SOLUTION INTRAVENOUS ONCE
Status: COMPLETED | OUTPATIENT
Start: 2020-03-16 | End: 2020-03-16

## 2020-03-16 RX ADMIN — IBUPROFEN 400 MG: 400 TABLET, FILM COATED ORAL at 22:17

## 2020-03-16 RX ADMIN — INSULIN LISPRO 4 UNITS: 100 INJECTION, SOLUTION INTRAVENOUS; SUBCUTANEOUS at 07:28

## 2020-03-16 RX ADMIN — HYDROCODONE BITARTRATE AND ACETAMINOPHEN 1 TABLET: 5; 325 TABLET ORAL at 23:09

## 2020-03-16 RX ADMIN — INSULIN LISPRO 3 UNITS: 100 INJECTION, SOLUTION INTRAVENOUS; SUBCUTANEOUS at 19:31

## 2020-03-16 RX ADMIN — ALPRAZOLAM 0.25 MG: 0.25 TABLET ORAL at 22:11

## 2020-03-16 RX ADMIN — INSULIN GLARGINE 19 UNITS: 100 INJECTION, SOLUTION SUBCUTANEOUS at 22:11

## 2020-03-16 RX ADMIN — Medication 10 ML: at 07:14

## 2020-03-16 RX ADMIN — Medication 10 ML: at 15:24

## 2020-03-16 RX ADMIN — PANTOPRAZOLE SODIUM 40 MG: 40 TABLET, DELAYED RELEASE ORAL at 07:14

## 2020-03-16 RX ADMIN — INSULIN LISPRO 4 UNITS: 100 INJECTION, SOLUTION INTRAVENOUS; SUBCUTANEOUS at 12:43

## 2020-03-16 RX ADMIN — MAGNESIUM SULFATE 2 G: 2 INJECTION INTRAVENOUS at 11:36

## 2020-03-16 RX ADMIN — ENOXAPARIN SODIUM 40 MG: 40 INJECTION SUBCUTANEOUS at 09:34

## 2020-03-16 RX ADMIN — HYDROCODONE BITARTRATE AND ACETAMINOPHEN 1 TABLET: 5; 325 TABLET ORAL at 03:42

## 2020-03-16 RX ADMIN — AMOXICILLIN AND CLAVULANATE POTASSIUM 1 TABLET: 875; 125 TABLET, FILM COATED ORAL at 22:11

## 2020-03-16 RX ADMIN — Medication 10 ML: at 11:05

## 2020-03-16 RX ADMIN — Medication 10 ML: at 22:14

## 2020-03-16 RX ADMIN — HYDROCODONE BITARTRATE AND ACETAMINOPHEN 1 TABLET: 5; 325 TABLET ORAL at 15:23

## 2020-03-16 RX ADMIN — METRONIDAZOLE 500 MG: 250 TABLET ORAL at 09:34

## 2020-03-16 RX ADMIN — ALPRAZOLAM 0.25 MG: 0.25 TABLET ORAL at 15:23

## 2020-03-16 RX ADMIN — MICONAZOLE NITRATE 200 MG: 200 SUPPOSITORY VAGINAL at 22:13

## 2020-03-16 RX ADMIN — METRONIDAZOLE 500 MG: 250 TABLET ORAL at 19:32

## 2020-03-16 RX ADMIN — INSULIN LISPRO 4 UNITS: 100 INJECTION, SOLUTION INTRAVENOUS; SUBCUTANEOUS at 19:31

## 2020-03-16 RX ADMIN — AMOXICILLIN AND CLAVULANATE POTASSIUM 1 TABLET: 875; 125 TABLET, FILM COATED ORAL at 09:35

## 2020-03-16 RX ADMIN — IBUPROFEN 400 MG: 400 TABLET, FILM COATED ORAL at 12:43

## 2020-03-16 RX ADMIN — MORPHINE SULFATE 1 MG: 2 INJECTION, SOLUTION INTRAMUSCULAR; INTRAVENOUS at 11:05

## 2020-03-16 RX ADMIN — ALPRAZOLAM 0.25 MG: 0.25 TABLET ORAL at 09:35

## 2020-03-16 RX ADMIN — INSULIN LISPRO 2 UNITS: 100 INJECTION, SOLUTION INTRAVENOUS; SUBCUTANEOUS at 12:44

## 2020-03-16 RX ADMIN — HYDROMORPHONE HYDROCHLORIDE 1 MG: 1 INJECTION, SOLUTION INTRAMUSCULAR; INTRAVENOUS; SUBCUTANEOUS at 07:14

## 2020-03-16 RX ADMIN — MORPHINE SULFATE 1 MG: 2 INJECTION, SOLUTION INTRAMUSCULAR; INTRAVENOUS at 23:14

## 2020-03-16 NOTE — PROGRESS NOTES
[facility] [facility] Adult  Hospitalist Group                                                                                          Hospitalist Progress Note  Bong BrownrAMANDA  Answering service: [phone] OR [phone] from in house phone        Date of Service:  3/16/2020  NAME:  Giacomo Boone  :  1980  MRN:  [id number]      Admission Summary:   Giacomo Boone is a 44 y.o. female with known hx of DM1, discharged from [facility] on 3/11/20 after I&D for perineal abscesses, incisions on both buttocks, draining pus. Pt is homeless and was discharge to [address] [place] [address]. Pt appears sleepy, dozzing off during the conversation, seems like under the influence. Pt stated to use marijuana. Pt felt very weak and unsteady and could not get up and urinated on self, so called 911 for the help and was brought to the ED. Pt denied any fever, chills, abdominal pain, chest pain, SOB, palpitation, dizziness, lightheadedness, nausea, vomiting. Pt is legally blind in R eye. No visible defects noticeable. No other concerns. History was little limited due to patient condition. Interval history / Subjective:     Patient awake and oriented. She has c/o of pain at wound site on buttocks that is currently manageable. She denies CP, SOB, N/V/D, abd pain. She states she is ready for discharge. Currently waiting on placement due to patient's homeless status. Assessment & Plan:     AMS and weakness, likely THC + related, resolved and at baseline. UDS Noted + for THC  DC IVF hydration with cleared mentation  PT/OT  Discussed the disposition with patient. When CM can arrange, [facility] to [facility], as pt is medically stable for DC     Hyperglycemia in DM1 patient, likely related to noncompliance. Currently well controlled  Overall poorly controled with A1C 11.2  NPH 15u once on admission, less sufficient  Continue Lantus 19u QHS. Working well in keeping BGs in goal range.   Serial accuchecks ACHS  DM mx consult, appreciate  Humalog 4u TIDAC and SSI normal resistence     Perineal abscess, s/p I&D at [facility] before DC on Clindamycin 450mg Q6hrs x 10 days on 3/11/20, likely noncompliant  switched to oral antibiotics  Continue Augmentin and metronidazole till 3/20/20  Was dc on Clindamycin from [facility] before admission. GNS on board  Wound care team on board  Tylenol and NSAIDs for pain control  Continue morphine 1 mg IV 15-30 minutes before dressing while wound packing is needed. No opiates on DC     Hypotension, likely hypovolemia and dehydration related to hyperglycemia  Improving/stable  BP still soft  Close monitoring. Pt is not symptomatic. Has intermittent opiates with dressing     Hypoxia, likely related to AMS and possible respiratory depression under the influence  Stable/resolved  RA currently and breathing well     Hx of Bahcet's disease  Stable and not on any immunomodulating agent  If any flare, would consider medrol dose ya or short burst     Dehydration related to hyperglycemia   resolved  Monitor off IVF     Marijuana use  UDS +  Counseled for absteinence  HIV screen per pt request, non-reactive     Hematuria  Improved/resolved  Active menstruation. No symptoms. No further evaluation at present    Patient ok to discharge, awaiting placement at this time. Emergency contact: Mother: Saurabh Mcpherson, [phone]. Sister: Chacorta Ferraro [phone]    Code status: Full  DVT prophylaxis: Lovenox SQ    Care Plan discussed with: Patient/Family, Nurse and Other Wound care  Anticipated Disposition: Homeless shelter[facility]. Ready for DC  Anticipated Discharge: When disposition arranged.  Medically stable for [place] Problems  Date Reviewed: 3/16/2020          Codes Class Noted POA    Perianal cellulitis ICD-10-CM: K61.0  ICD-9-CM: 924  3/12/2020 Unknown        Weakness ICD-10-CM: R53.1  ICD-9-CM: 780.79  3/12/2020 Unknown        Hyperglycemia ICD-10-CM: R73.9  ICD-9-CM: 790.29  3/12/2020 Unknown                Review of Systems:   A comprehensive review of systems was negative except for that written in the HPI. Vital Signs:    Last 24hrs VS reviewed since prior progress note. Most recent are:  Visit Vitals  BP 99/64 (BP 1 Location: Left arm, BP Patient Position: At rest)   Pulse 84   Temp 98.2 °F (36.8 °C)   Resp 17   Ht 5' 6\" (1.676 m)   Wt 104.2 kg (229 lb 11.5 oz)   SpO2 97%   BMI 37.08 kg/m²         Intake/Output Summary (Last 24 hours) at 3/16/2020 1632  Last data filed at 3/16/2020 0344  Gross per 24 hour   Intake    Output 850 ml   Net -850 ml        Physical Examination:   General:          Alert, cooperative, no distress, appears stated age. HEENT:           Atraumatic, anicteric sclerae, pink conjunctivae                          No oral ulcers, mucosa moist, throat clear, dentition fair  Neck:               Symmetrical  Lungs:             Clear to auscultation bilaterally.  No Wheezing or Rhonchi. No rales. Chest wall:      No tenderness  No Accessory muscle use. Heart:              Regular  rhythm,  No  murmur   No edema  Abdomen:        Soft, non-tender. Not distended.  Bowel sounds normal. Perineal/ buttock   dressing in  place. Wounds pink with serosangenous drainage, New, dry, clean dressing intact. Extremities:     No cyanosis.  No clubbing,                            Skin turgor normal, Capillary refill normal  Skin:                Not pale.  Not Jaundiced  No rashes   Psych:             Not anxious or agitated. Neurologic:      Alert, moves all extremities, answers questions appropriately and responds to commands, equal strength. Data Review:    Review and/or order of clinical lab test  Review and/or order of tests in the radiology section of CPT  Review and/or order of tests in the medicine section of CPT    Ct Head Wo Cont    Result Date: 3/12/2020  IMPRESSION: Unremarkable CT of the head.      Ct Abd Pelv W Cont    Result Date: 3/12/2020  IMPRESSION: Perineal cellulitis without evidence of abscess. Xr Chest Port    Result Date: 3/12/2020  IMPRESSION: Normal chest.      Labs:     Recent Labs     03/16/20  0408 03/14/20  1552   WBC 8.8 6.6   HGB 9.4* 8.8*   HCT 30.6* 28.8*   * 344     Recent Labs     03/16/20  0408 03/14/20  1552    140   K 4.1 3.6    107   CO2 28 27   BUN 4* 2*   CREA 0.59 0.66   * 112*   CA 8.7 8.2*   MG 1.7 1.7     No results for input(s): SGOT, GPT, ALT, AP, TBIL, TBILI, TP, ALB, GLOB, GGT, AML, LPSE in the last 72 hours. No lab exists for component: AMYP, HLPSE  No results for input(s): INR, PTP, APTT, INREXT, INREXT in the last 72 hours. No results for input(s): FE, TIBC, PSAT, FERR in the last 72 hours. No results found for: FOL, RBCF   No results for input(s): PH, PCO2, PO2 in the last 72 hours. No results for input(s): CPK, CKNDX, TROIQ in the last 72 hours.     No lab exists for component: CPKMB  No results found for: CHOL, CHOLX, CHLST, CHOLV, HDL, HDLP, LDL, LDLC, DLDLP, TGLX, TRIGL, TRIGP, CHHD, CHHDX  Lab Results   Component Value Date/Time    Glucose (POC) 188 (H) 03/16/2020 11:30 AM    Glucose (POC) 134 (H) 03/16/2020 07:21 AM    Glucose (POC) 141 (H) 03/15/2020 09:10 PM    Glucose (POC) 130 (H) 03/15/2020 04:15 PM    Glucose (POC) 135 (H) 03/15/2020 11:16 AM     Lab Results   Component Value Date/Time    Color PINK 03/12/2020 05:41 AM    Appearance CLEAR 03/12/2020 05:41 AM    Specific gravity <1.005 03/12/2020 05:41 AM    pH (UA) 6.0 03/12/2020 05:41 AM    Protein TRACE (A) 03/12/2020 05:41 AM    Glucose >1,000 (A) 03/12/2020 05:41 AM    Ketone NEGATIVE  03/12/2020 05:41 AM    Bilirubin NEGATIVE  03/12/2020 05:41 AM    Urobilinogen 1.0 03/12/2020 05:41 AM    Nitrites NEGATIVE  03/12/2020 05:41 AM    Leukocyte Esterase NEGATIVE  03/12/2020 05:41 AM    Epithelial cells FEW 03/12/2020 05:41 AM    Bacteria NEGATIVE  03/12/2020 05:41 AM    WBC 0-4 03/12/2020 05:41 AM    RBC >100 (H) 03/12/2020 05:41 AM         Medications Reviewed:     Current Facility-Administered Medications   Medication Dose Route Frequency    influenza vaccine 2019-20 (6 mos+)(PF) (FLUARIX/FLULAVAL/FLUZONE QUAD) injection 0.5 mL  0.5 mL IntraMUSCular PRIOR TO DISCHARGE    HYDROcodone-acetaminophen (NORCO) 5-325 mg per tablet 1 Tab  1 Tab Oral Q8H PRN    morphine injection 1 mg  1 mg IntraVENous BID PRN    amoxicillin-clavulanate (AUGMENTIN) 875-125 mg per tablet 1 Tab  1 Tab Oral Q12H    metroNIDAZOLE (FLAGYL) tablet 500 mg  500 mg Oral BID    miconazole (MICOTIN) 200 mg vaginal suppository 200 mg  200 mg Vaginal QHS    ALPRAZolam (XANAX) tablet 0.25 mg  0.25 mg Oral TID    sodium chloride (NS) flush 5-40 mL  5-40 mL IntraVENous PRN    insulin lispro (HUMALOG) injection   SubCUTAneous AC&HS    sodium chloride (NS) flush 5-40 mL  5-40 mL IntraVENous Q8H    sodium chloride (NS) flush 5-40 mL  5-40 mL IntraVENous PRN    acetaminophen (TYLENOL) tablet 650 mg  650 mg Oral Q4H PRN    ondansetron (ZOFRAN) injection 4 mg  4 mg IntraVENous Q4H PRN    ibuprofen (MOTRIN) tablet 400 mg  400 mg Oral Q6H PRN    enoxaparin (LOVENOX) injection 40 mg  40 mg SubCUTAneous Q24H    glucose chewable tablet 16 g  4 Tab Oral PRN    glucagon (GLUCAGEN) injection 1 mg  1 mg IntraMUSCular PRN    dextrose 10% infusion 0-250 mL  0-250 mL IntraVENous PRN    insulin glargine (LANTUS) injection 19 Units  0.2 Units/kg SubCUTAneous QHS    nicotine (NICODERM CQ) 21 mg/24 hr patch 1 Patch  1 Patch TransDERmal Q24H    insulin lispro (HUMALOG) injection 4 Units  4 Units SubCUTAneous TIDAC    pantoprazole (PROTONIX) tablet 40 mg  40 mg Oral ACB     ______________________________________________________________________  EXPECTED LENGTH OF STAY: 2d 21h  ACTUAL LENGTH OF STAY:          4                 Allison Hom[place]assa, NP       I reviewed the patient's medical history, the NP's findings on physical examination, the patient's diagnoses, and treatment plan as documented in the NP note.  I concur with the treatment plan as documented. Additional suggestions noted. I have personally seen and examined the patient and agree with the details as noted above.

## 2020-03-16 NOTE — PROGRESS NOTES
Bedside shift change report given to Segun Kurtz (oncoming nurse) by Harjit Knight RN (offgoing nurse). Report included the following information SBAR, MAR and Recent Results.

## 2020-03-16 NOTE — PROGRESS NOTES
Problem: Pressure Injury - Risk of  Goal: *Prevention of pressure injury  Description: Document Nicholas Scale and appropriate interventions in the flowsheet.   Note: Pressure Injury Interventions:  Sensory Interventions: Assess changes in LOC    Moisture Interventions: Maintain skin hydration (lotion/cream)    Activity Interventions: Pressure redistribution bed/mattress(bed type)    Mobility Interventions: Pressure redistribution bed/mattress (bed type)    Nutrition Interventions: Document food/fluid/supplement intake    Friction and Shear Interventions: HOB 30 degrees or less

## 2020-03-16 NOTE — PROGRESS NOTES
DERIC:  1. [facility] referral pending. 2. Home Health referrals pending. 3. Follow-up appointments pending. CM spoke with Nikki Jones at [facility], he received referral. He is requesting that patient have home health arranged for wound care, cm sent referral to [address] [facility]. Cm requested CM specialist to schedule new PCP appointment closest to area of where patient will be located. Patient is agreeable with plan, and cm will follow-up when appropriate. 1523: [address] [facility] unable to provide daily nursing care for wound care. Referrals sent to [facility], and [facility]. All the above are unable to accept patient. Cm sent referrals to Daisha Marie, [facility], [address] [place] [address] they are unable to accept her insurance. [facility] referral pending.      [facility]

## 2020-03-16 NOTE — PROGRESS NOTES
Occupational Therapy    Chart reviewed, attempted to see patient for skilled OT; however, she declined 2/2 taking shower prior to OT's arrival for therapy. OT educated pt on benefits of dynamic activity to facilitate return to PLOF; however, pt continued to decline engagement. Will continue to follow as able and appropriate.     Thank you,  Reno Stuart, OT

## 2020-03-16 NOTE — PROGRESS NOTES
Problem: Mobility Impaired (Adult and Pediatric)  Goal: *Acute Goals and Plan of Care (Insert Text)  Description: FUNCTIONAL STATUS PRIOR TO ADMISSION: Patient was independent and active without use of DME. Reports \"balance\" issues at baseline but no falls    HOME SUPPORT PRIOR TO ADMISSION: Patient was living in [address] [place] [address] at baseline. Has family but she cannot live with them and they cannot assist her (per patient)    Physical Therapy Goals  Initiated 3/13/2020  1. Patient will move from supine to sit and sit to supine  in bed with modified independence within 7 day(s). 2.  Patient will transfer from bed to chair and chair to bed with modified independence using the least restrictive device within 7 day(s). 3.  Patient will perform sit to stand with modified independence within 7 day(s). 4.  Patient will ambulate with modified independence for 250 feet with the least restrictive device within 7 day(s). Outcome: Progressing Towards Goal  PHYSICAL THERAPY TREATMENT  Patient: Edyta Nunez (75 y.o. female)  Date: 3/16/2020  Diagnosis: Hypoxia [R09.02]  Perianal cellulitis [K61.0]  Hyperglycemia [R73.9]  Weakness [R53.1]   <principal problem not specified>       Precautions: Fall  Chart, physical therapy assessment, plan of care and goals were reviewed. ASSESSMENT  Patient continues with skilled PT services and is progressing towards goals. Pt initially declined - but with encouragement - ambulated in hallway with standby guard/supervision. Encouraged pt to increase activity to improve strength and balance. Current Level of Function Impacting Discharge (mobility/balance): Mod independent for transfers/amb    Other factors to consider for discharge:          PLAN :  Patient continues to benefit from skilled intervention to address the above impairments. Continue treatment per established plan of care. to address goals.     Recommendation for discharge: (in order for the patient to meet his/her long term goals)  No skilled physical therapy/ follow up rehabilitation needs identified at this time. This discharge recommendation:  Has been made in collaboration with the attending provider and/or case management    IF patient discharges home will need the following DME: none       SUBJECTIVE:   Patient stated I know I need to walk.     OBJECTIVE DATA SUMMARY:   Critical Behavior:  Neurologic State: Alert  Orientation Level: Oriented X4  Cognition: Follows commands  Safety/Judgement: Fall prevention  Functional Mobility Training:  Bed Mobility:  Rolling: Modified independent  Supine to Sit: Modified independent  Sit to Supine: Modified independent  Scooting: Modified independent        Transfers:  Sit to Stand: Stand-by assistance  Stand to Sit: Stand-by assistance                             Balance:  Sitting: Intact  Standing: Impaired  Standing - Static: Good  Standing - Dynamic : Fair  Ambulation/Gait Training:  Distance (ft): 150 Feet (ft)  Assistive Device: Walker, rolling;Gait belt  Ambulation - Level of Assistance: Supervision        Gait Abnormalities: Decreased step clearance; Path deviations(reaching out to walls)        Base of Support: Center of gravity altered; Widened     Speed/Elizabeth: Slow  Step Length: Right shortened;Left shortened          Activity Tolerance:   Fair - needs encouragement to increase time out of bed. Please refer to the flowsheet for vital signs taken during this treatment. After treatment patient left in no apparent distress:   Supine in bed and Call bell within reach    COMMUNICATION/COLLABORATION:   The patients plan of care was discussed with: Registered nurse.      Deisy Julian, PT   Time Calculation: 16 mins

## 2020-03-16 NOTE — PROGRESS NOTES
[facility]      Subjective     No acute events. States she is feeling better. Wants to go home. Objective     Patient Vitals for the past 24 hrs:   Temp Pulse Resp BP SpO2   03/16/20 0841 98.8 °F (37.1 °C) 81 18 113/68 96 %   03/16/20 0341 97.3 °F (36.3 °C) 84 16 123/87 94 %   03/15/20 2130 98.2 °F (36.8 °C) 70 16 93/53 96 %   03/15/20 1407 98.2 °F (36.8 °C) 90 16 96/67 97 %       Date 03/15/20 0700 - 03/16/20 0659 03/16/20 0700 - 03/17/20 0659   Shift 4194-3665 0480-9402 24 Hour Total 6109-2179 1518-7253 24 Hour Total   INTAKE   Shift Total(mL/kg)         OUTPUT   Urine(mL/kg/hr)  850(0.7) 850(0.3)        Urine Voided  850 850        Urine Occurrence(s) 2 x 1 x 3 x      Shift Total(mL/kg)  850(8.2) 850(8.2)      NET  -850 -850      Weight (kg) 102.5 104.2 104.2 104.2 104.2 104.2       PE  GEN - Awake, alert, communicating appropriately. NAD  Perineum - Bilateral perirectal abscesses s/p I&D. No signficant residual induration or erythema. No fluctuance. Minimal tenderness to palpation around the sites today. No obvious purulence noted or expressed on palpation.        Labs  Recent Results (from the past 24 hour(s))   GLUCOSE, POC    Collection Time: 03/15/20 11:16 AM   Result Value Ref Range    Glucose (POC) 135 (H) 65 - 100 mg/dL    Performed by Marine Crews    GLUCOSE, POC    Collection Time: 03/15/20  4:15 PM   Result Value Ref Range    Glucose (POC) 130 (H) 65 - 100 mg/dL    Performed by THAD PEREZ    GLUCOSE, POC    Collection Time: 03/15/20  9:10 PM   Result Value Ref Range    Glucose (POC) 141 (H) 65 - 100 mg/dL    Performed by Nelda Gusman    CBC W/O DIFF    Collection Time: 03/16/20  4:08 AM   Result Value Ref Range    WBC 8.8 3.6 - 11.0 K/uL    RBC 3.68 (L) 3.80 - 5.20 M/uL    HGB 9.4 (L) 11.5 - 16.0 g/dL    HCT 30.6 (L) 35.0 - 47.0 %    MCV 83.2 80.0 - 99.0 FL    MCH 25.5 (L) 26.0 - 34.0 PG    MCHC 30.7 30.0 - 36.5 g/dL    RDW 17.2 (H) 11.5 - 14.5 %    PLATELET 621 (H) 519 - 400 K/uL    MPV 9.5 8.9 - 12.9 FL    NRBC 0.2 (H) 0  WBC    ABSOLUTE NRBC 0.02 (H) 0.00 - 6.81 K/uL   METABOLIC PANEL, BASIC    Collection Time: 03/16/20  4:08 AM   Result Value Ref Range    Sodium 137 136 - 145 mmol/L    Potassium 4.1 3.5 - 5.1 mmol/L    Chloride 104 97 - 108 mmol/L    CO2 28 21 - 32 mmol/L    Anion gap 5 5 - 15 mmol/L    Glucose 128 (H) 65 - 100 mg/dL    BUN 4 (L) 6 - 20 MG/DL    Creatinine 0.59 0.55 - 1.02 MG/DL    BUN/Creatinine ratio 7 (L) 12 - 20      GFR est AA >60 >60 ml/min/1.73m2    GFR est non-AA >60 >60 ml/min/1.73m2    Calcium 8.7 8.5 - 10.1 MG/DL   MAGNESIUM    Collection Time: 03/16/20  4:08 AM   Result Value Ref Range    Magnesium 1.7 1.6 - 2.4 mg/dL   GLUCOSE, POC    Collection Time: 03/16/20  7:21 AM   Result Value Ref Range    Glucose (POC) 134 (H) 65 - 100 mg/dL    Performed by Velvet Bird is a 44 y. o.yr old female with bilateral perirectal abscesses s/p I&D at [facility]. Plan     - Continue wound care. Appreciate input from wound care team.   - Could be discharged with home health/wound care from surgical perspective once home wound care plan established.     - Antibiotics per primary team    David Fletcher MD  3/16/2020  9:22 AM

## 2020-03-16 NOTE — PROGRESS NOTES
Hospital follow-up [facility] transitional care appointment has been scheduled with  [facility] for Monday, 3/23/20 at 10:30 a.m. Pending patient discharge.   Vazquez Solomon, Care Management Specialist.

## 2020-03-17 LAB
BACTERIA SPEC CULT: NORMAL
GLUCOSE BLD STRIP.AUTO-MCNC: 104 MG/DL (ref 65–100)
GLUCOSE BLD STRIP.AUTO-MCNC: 114 MG/DL (ref 65–100)
GLUCOSE BLD STRIP.AUTO-MCNC: 127 MG/DL (ref 65–100)
GLUCOSE BLD STRIP.AUTO-MCNC: 143 MG/DL (ref 65–100)
SERVICE CMNT-IMP: ABNORMAL
SERVICE CMNT-IMP: NORMAL

## 2020-03-17 PROCEDURE — 82962 GLUCOSE BLOOD TEST: CPT

## 2020-03-17 PROCEDURE — 65270000032 HC RM SEMIPRIVATE

## 2020-03-17 PROCEDURE — 74011250637 HC RX REV CODE- 250/637: Performed by: INTERNAL MEDICINE

## 2020-03-17 PROCEDURE — 74011636637 HC RX REV CODE- 636/637: Performed by: INTERNAL MEDICINE

## 2020-03-17 PROCEDURE — 74011250636 HC RX REV CODE- 250/636: Performed by: INTERNAL MEDICINE

## 2020-03-17 PROCEDURE — 74011250637 HC RX REV CODE- 250/637: Performed by: NURSE PRACTITIONER

## 2020-03-17 RX ADMIN — ENOXAPARIN SODIUM 40 MG: 40 INJECTION SUBCUTANEOUS at 08:56

## 2020-03-17 RX ADMIN — INSULIN LISPRO 4 UNITS: 100 INJECTION, SOLUTION INTRAVENOUS; SUBCUTANEOUS at 12:05

## 2020-03-17 RX ADMIN — AMOXICILLIN AND CLAVULANATE POTASSIUM 1 TABLET: 875; 125 TABLET, FILM COATED ORAL at 21:18

## 2020-03-17 RX ADMIN — PANTOPRAZOLE SODIUM 40 MG: 40 TABLET, DELAYED RELEASE ORAL at 08:23

## 2020-03-17 RX ADMIN — ALPRAZOLAM 0.25 MG: 0.25 TABLET ORAL at 15:30

## 2020-03-17 RX ADMIN — METRONIDAZOLE 500 MG: 250 TABLET ORAL at 17:06

## 2020-03-17 RX ADMIN — INSULIN LISPRO 4 UNITS: 100 INJECTION, SOLUTION INTRAVENOUS; SUBCUTANEOUS at 17:06

## 2020-03-17 RX ADMIN — Medication 10 ML: at 21:21

## 2020-03-17 RX ADMIN — INSULIN LISPRO 4 UNITS: 100 INJECTION, SOLUTION INTRAVENOUS; SUBCUTANEOUS at 08:22

## 2020-03-17 RX ADMIN — ALPRAZOLAM 0.25 MG: 0.25 TABLET ORAL at 08:55

## 2020-03-17 RX ADMIN — INSULIN LISPRO 2 UNITS: 100 INJECTION, SOLUTION INTRAVENOUS; SUBCUTANEOUS at 08:22

## 2020-03-17 RX ADMIN — AMOXICILLIN AND CLAVULANATE POTASSIUM 1 TABLET: 875; 125 TABLET, FILM COATED ORAL at 08:55

## 2020-03-17 RX ADMIN — MORPHINE SULFATE 1 MG: 2 INJECTION, SOLUTION INTRAMUSCULAR; INTRAVENOUS at 15:33

## 2020-03-17 RX ADMIN — METRONIDAZOLE 500 MG: 250 TABLET ORAL at 08:55

## 2020-03-17 RX ADMIN — ALPRAZOLAM 0.25 MG: 0.25 TABLET ORAL at 21:18

## 2020-03-17 NOTE — PROGRESS NOTES
DERIC:  1. [facility] transfer. CM  sent request to [facility] for patient transfer. Patient is agreeable and knows that she will transition to continue care for her wounds. Her future plan will be to become accepted to [facility].     Jorden Goel, [facility]

## 2020-03-17 NOTE — PROGRESS NOTES
Bedside shift change report given to Eulalio Mann (oncoming nurse) by Rhonda Abarca (offgoing nurse). Report included the following information SBAR, Kardex, MAR and Recent Results.

## 2020-03-17 NOTE — PROGRESS NOTES
Bedside shift change report given to [address] (oncoming nurse) by Sophia Holt RN (offgoing nurse). Report included the following information SBAR, Kardex, MAR and Recent Results.

## 2020-03-17 NOTE — PROGRESS NOTES
Bedside shift change report given to Gretchen Espinal RN (oncoming nurse) by Melvin Becerra RN (offgoing nurse). Report included the following information SBAR and Kardex.

## 2020-03-17 NOTE — PROGRESS NOTES
DERIC:  1. [facility] pending. 2. [facility] referral pending. CM sent referral to [facility] and [address] [place] [address].     [facility]

## 2020-03-17 NOTE — DIABETES MGMT
[facility]  CLINICAL NURSE SPECIALIST CONSULT  PROGRAM FOR DIABETES HEALTH    FOLLOW-UP NOTE    Presentation   Vernon Underwood is a 44 y.o. female admitted with perirectal abscesses and consulted by Provider for advanced specialty nursing care related to inpatient diabetes management. Hyperglycemia management order set is in place. Subjective   Ms. Keren Ricci is currently sleeping --  Objective     Vital Signs   Visit Vitals  /76 (BP 1 Location: Right arm, BP Patient Position: At rest)   Pulse 75   Temp 97.3 °F (36.3 °C)   Resp 16   Ht 5' 6\" (1.676 m)   Wt 104.2 kg (229 lb 11.5 oz)   SpO2 99%   BMI 37.08 kg/m²   . Laboratory  Lab Results   Component Value Date/Time    Hemoglobin A1c 11.2 (H) 03/12/2020 06:39 AM     No results found for: LDL, LDLC, DLDLP  Lab Results   Component Value Date/Time    Creatinine 0.59 03/16/2020 04:08 AM     Lab Results   Component Value Date/Time    Sodium 137 03/16/2020 04:08 AM    Potassium 4.1 03/16/2020 04:08 AM    Chloride 104 03/16/2020 04:08 AM    CO2 28 03/16/2020 04:08 AM    Anion gap 5 03/16/2020 04:08 AM    Glucose 128 (H) 03/16/2020 04:08 AM    BUN 4 (L) 03/16/2020 04:08 AM    Creatinine 0.59 03/16/2020 04:08 AM    BUN/Creatinine ratio 7 (L) 03/16/2020 04:08 AM    GFR est AA >60 03/16/2020 04:08 AM    GFR est non-AA >60 03/16/2020 04:08 AM    Calcium 8.7 03/16/2020 04:08 AM    Bilirubin, total 0.3 03/13/2020 03:47 AM    AST (SGOT) 22 03/13/2020 03:47 AM    Alk.  phosphatase 90 03/13/2020 03:47 AM    Protein, total 6.6 03/13/2020 03:47 AM    Albumin 1.9 (L) 03/13/2020 03:47 AM    Globulin 4.7 (H) 03/13/2020 03:47 AM    A-G Ratio 0.4 (L) 03/13/2020 03:47 AM    ALT (SGPT) 17 03/13/2020 03:47 AM     Lab Results   Component Value Date/Time    ALT (SGPT) 17 03/13/2020 03:47 AM       Blood glucose pattern          Assessment and Plan   Nursing Diagnosis Risk for unstable blood glucose pattern   Nursing Intervention Domain 3741 Decision-making Support   Nursing Interventions Examined current inpatient diabetes control   Explored factors facilitating and impeding inpatient management     Evaluation   Ms. Nickie Mcfadden  with uncontrolled Type 2 diabetes, has achieved inpatient blood glucose target of 100-180mg/dl. She has maintained stable BG within target since the weekend. She remains on 19units Lantus daily; plus mealtime bolus and correction. Since A1C is high, she will require additional diabetes medication to bring A1C down. Recommendations/Discharge Planning   Recommend:  1. Continue with current basal/bolus/correction regimen     2. On discharge: Continue metformin 1000 mg BID     While insulin is appropriate for her diabetes; she does not have resources to properly store.  I also am not confident in her mental capability and skill to administer.  Please consider consider switching to a cost effective pill such as Amaryl 2 mg Daily        Billing Code(s)     [x] 32681 IP subsequent hospital care - 15 minutes    ROSHNI Au  Access via [facility] [phone]

## 2020-03-17 NOTE — PROGRESS NOTES
Problem: Falls - Risk of  Goal: *Absence of Falls  Description: Document Mairajose Verma Fall Risk and appropriate interventions in the flowsheet. Outcome: Progressing Towards Goal  Note: Fall Risk Interventions:  Mobility Interventions: Patient to call before getting OOB    Mentation Interventions: Adequate sleep, hydration, pain control    Medication Interventions: Teach patient to arise slowly    Elimination Interventions: Call light in reach              Problem: Pressure Injury - Risk of  Goal: *Prevention of pressure injury  Description: Document Nicholas Scale and appropriate interventions in the flowsheet.   Outcome: Progressing Towards Goal  Note: Pressure Injury Interventions:  Sensory Interventions: Assess changes in LOC    Moisture Interventions: Absorbent underpads    Activity Interventions: Increase time out of bed    Mobility Interventions: Pressure redistribution bed/mattress (bed type)    Nutrition Interventions: Document food/fluid/supplement intake    Friction and Shear Interventions: HOB 30 degrees or less                Problem: Impaired Skin Integrity/Pressure Injury Treatment  Goal: *Improvement of Existing Pressure Injury  Outcome: Progressing Towards Goal     Problem: Pain  Goal: *Control of Pain  Outcome: Progressing Towards Goal     Problem: Pain  Goal: *Control of Pain  Outcome: Progressing Towards Goal

## 2020-03-17 NOTE — PROGRESS NOTES
[facility] Adult  Hospitalist Group                                                                                          Hospitalist Progress Note  Louvenia Rinne, NP  Answering service: [phone] OR [phone] from in house phone        Date of Service:  3/17/2020  NAME:  Lukas Hollis  :  1980  MRN:  [id number]      Admission Summary:   Lukas Hollis is a 44 y.o. female with known hx of DM1, discharged from [address] [place] [address] on 3/11/20 after I&D for perineal abscesses, incisions on both buttocks, draining pus. Pt is homeless and was discharge to [address] [place] [address]. Pt appears sleepy, dozzing off during the conversation, seems like under the influence. Pt stated to use marijuana. Pt felt very weak and unsteady and could not get up and urinated on self, so called 911 for the help and was brought to the ED. Pt denied any fever, chills, abdominal pain, chest pain, SOB, palpitation, dizziness, lightheadedness, nausea, vomiting. Pt is legally blind in R eye. No visible defects noticeable. No other concerns. History was little limited due to patient condition. Interval history / Subjective:     Patient awake and oriented. She has c/o of pain at wound site on buttocks that is currently manageable. She denies CP, SOB, N/V/D, abd pain. She states she is ready for discharge. Currently waiting on placement due to patient's homeless status. Assessment & Plan:     AMS and weakness, likely THC + related, resolved and at baseline. Resolved  Patient alert and oriented  UDS Noted + for [facility]  PT/OT  Discussed the disposition with patient. When CM can arrange, DC to [facility], as pt is medically stable for DC     Hyperglycemia in DM1 patient, likely related to noncompliance. Currently well controlled  Overall poorly controled with A1C 11.2  NPH 15u once on admission, less sufficient  Continue Lantus 19u QHS. Working well in keeping BGs in goal range.   Serial accuchecks ACHS  DM mx consult, appreciate  Humalog 4u TIDAC and SSI normal resistence     Perineal abscess, s/p I&D at [facility] before DC on Clindamycin 450mg Q6hrs x 10 days on 3/11/20, likely noncompliant  switched to oral antibiotics  Continue Augmentin and metronidazole till 3/20/20  Was dc on Clindamycin from [facility] before admission. GNS on board  Wound care team on board  Tylenol and NSAIDs for pain control  Continue morphine 1 mg IV 15-30 minutes before dressing while wound packing is needed. No opiates on DC     Hypotension, likely hypovolemia and dehydration related to hyperglycemia  Improving/stable  BP still soft  Close monitoring. Pt is not symptomatic. Has intermittent opiates with dressing     Hypoxia, likely related to AMS and possible respiratory depression under the influence  Stable/resolved  RA currently and breathing well     Hx of Bahcet's disease  Stable and not on any immunomodulating agent  If any flare, would consider medrol dose ya or short burst     Dehydration related to hyperglycemia   resolved  Monitor off IVF     Marijuana use  UDS +  Counseled for absteinence  HIV screen per pt request, non-reactive     Hematuria  Improved/resolved  Active menstruation. No symptoms. No further evaluation at present    Patient ok to discharge, awaiting placement at this time. Possible transfer to [facility] for inpatient wound care. Emergency contact: Mother: Alexandro Keller, [phone]. Sister: Keanu Links [phone]    Code status: Full  DVT prophylaxis: Lovenox SQ    Care Plan discussed with: Patient/Family, Nurse and Other Wound care  Anticipated Disposition: Homeless shelter [facility]. Ready for DC  Anticipated Discharge: When disposition arranged.  Medically stable for [place] Problems  Date Reviewed: 3/16/2020          Codes Class Noted POA    Perianal cellulitis ICD-10-CM: K61.0  ICD-9-CM: 215  3/12/2020 Unknown        Weakness ICD-10-CM: R53.1  ICD-9-CM: 780.79  3/12/2020 Unknown        Hyperglycemia ICD-10-CM: R73.9  ICD-9-CM: 790.29  3/12/2020 Unknown                Review of Systems:   A comprehensive review of systems was negative except for that written in the HPI. Vital Signs:    Last 24hrs VS reviewed since prior progress note. Most recent are:  Patient Vitals for the past 24 hrs:   Temp Pulse Resp BP SpO2   03/17/20 0902 97.3 °F (36.3 °C) 75 16 106/76 99 %   03/16/20 2033 98.4 °F (36.9 °C) 69 14 103/69 96 %   03/16/20 1926     96 %   03/16/20 1408 98.2 °F (36.8 °C) 84 17 99/64 97 %           Intake/Output Summary (Last 24 hours) at 3/17/2020 1351  Last data filed at 3/16/2020 1834  Gross per 24 hour   Intake 360 ml   Output    Net 360 ml        Physical Examination:   General:          Alert, cooperative, no distress, appears stated age. Neck:               Symmetrical  Lungs:             XYQMZ to auscultation bilaterally.  No Wheezing or Rhonchi. No rales. Heart:              Regular  rhythm,  No  murmur   No edema  Abdomen:        Soft, non-tender. Not distended.  Bowel sounds normal. Perineal/ buttock   dressing in  place. dry, clean dressing intact. Extremities:     No cyanosis.  No clubbing,                            Skin turgor normal, Capillary refill normal  Skin:                Not pale.  Not Jaundiced  No rashes   Psych:             Not anxious or agitated. Neurologic:      Alert, moves all extremities, answers questions appropriately and responds to commands, equal strength. Data Review:    Review and/or order of clinical lab test  Review and/or order of tests in the radiology section of CPT  Review and/or order of tests in the medicine section of CPT    Ct Head Wo Cont    Result Date: 3/12/2020  IMPRESSION: Unremarkable CT of the head. Ct Abd Pelv W Cont    Result Date: 3/12/2020  IMPRESSION: Perineal cellulitis without evidence of abscess.     Xr Chest Port    Result Date: 3/12/2020  IMPRESSION: Normal chest.      Labs:     Recent Labs     03/16/20  0408 03/14/20  1552 WBC 8.8 6.6   HGB 9.4* 8.8*   HCT 30.6* 28.8*   * 344     Recent Labs     03/16/20  0408 03/14/20  1552    140   K 4.1 3.6    107   CO2 28 27   BUN 4* 2*   CREA 0.59 0.66   * 112*   CA 8.7 8.2*   MG 1.7 1.7     No results for input(s): SGOT, GPT, ALT, AP, TBIL, TBILI, TP, ALB, GLOB, GGT, AML, LPSE in the last 72 hours. No lab exists for component: AMYP, HLPSE  No results for input(s): INR, PTP, APTT, INREXT, INREXT in the last 72 hours. No results for input(s): FE, TIBC, PSAT, FERR in the last 72 hours. No results found for: FOL, RBCF   No results for input(s): PH, PCO2, PO2 in the last 72 hours. No results for input(s): CPK, CKNDX, TROIQ in the last 72 hours.     No lab exists for component: CPKMB  No results found for: CHOL, CHOLX, CHLST, CHOLV, HDL, HDLP, LDL, LDLC, DLDLP, TGLX, TRIGL, TRIGP, CHHD, CHHDX  Lab Results   Component Value Date/Time    Glucose (POC) 127 (H) 03/17/2020 11:33 AM    Glucose (POC) 143 (H) 03/17/2020 07:28 AM    Glucose (POC) 180 (H) 03/16/2020 10:07 PM    Glucose (POC) 214 (H) 03/16/2020 06:32 PM    Glucose (POC) 154 (H) 03/16/2020 04:40 PM     Lab Results   Component Value Date/Time    Color PINK 03/12/2020 05:41 AM    Appearance CLEAR 03/12/2020 05:41 AM    Specific gravity <1.005 03/12/2020 05:41 AM    pH (UA) 6.0 03/12/2020 05:41 AM    Protein TRACE (A) 03/12/2020 05:41 AM    Glucose >1,000 (A) 03/12/2020 05:41 AM    Ketone NEGATIVE  03/12/2020 05:41 AM    Bilirubin NEGATIVE  03/12/2020 05:41 AM    Urobilinogen 1.0 03/12/2020 05:41 AM    Nitrites NEGATIVE  03/12/2020 05:41 AM    Leukocyte Esterase NEGATIVE  03/12/2020 05:41 AM    Epithelial cells FEW 03/12/2020 05:41 AM    Bacteria NEGATIVE  03/12/2020 05:41 AM    WBC 0-4 03/12/2020 05:41 AM    RBC >100 (H) 03/12/2020 05:41 AM         Medications Reviewed:     Current Facility-Administered Medications   Medication Dose Route Frequency    influenza vaccine 2019-20 (6 mos+)(PF) (FLUARIX/FLULAVAL/FLUZONE QUAD) injection 0.5 mL  0.5 mL IntraMUSCular PRIOR TO DISCHARGE    HYDROcodone-acetaminophen (NORCO) 5-325 mg per tablet 1 Tab  1 Tab Oral Q8H PRN    morphine injection 1 mg  1 mg IntraVENous BID PRN    amoxicillin-clavulanate (AUGMENTIN) 875-125 mg per tablet 1 Tab  1 Tab Oral Q12H    metroNIDAZOLE (FLAGYL) tablet 500 mg  500 mg Oral BID    ALPRAZolam (XANAX) tablet 0.25 mg  0.25 mg Oral TID    sodium chloride (NS) flush 5-40 mL  5-40 mL IntraVENous PRN    insulin lispro (HUMALOG) injection   SubCUTAneous AC&HS    sodium chloride (NS) flush 5-40 mL  5-40 mL IntraVENous Q8H    sodium chloride (NS) flush 5-40 mL  5-40 mL IntraVENous PRN    acetaminophen (TYLENOL) tablet 650 mg  650 mg Oral Q4H PRN    ondansetron (ZOFRAN) injection 4 mg  4 mg IntraVENous Q4H PRN    ibuprofen (MOTRIN) tablet 400 mg  400 mg Oral Q6H PRN    enoxaparin (LOVENOX) injection 40 mg  40 mg SubCUTAneous Q24H    glucose chewable tablet 16 g  4 Tab Oral PRN    glucagon (GLUCAGEN) injection 1 mg  1 mg IntraMUSCular PRN    dextrose 10% infusion 0-250 mL  0-250 mL IntraVENous PRN    insulin glargine (LANTUS) injection 19 Units  0.2 Units/kg SubCUTAneous QHS    nicotine (NICODERM CQ) 21 mg/24 hr patch 1 Patch  1 Patch TransDERmal Q24H    insulin lispro (HUMALOG) injection 4 Units  4 Units SubCUTAneous TIDAC    pantoprazole (PROTONIX) tablet 40 mg  40 mg Oral ACB     ______________________________________________________________________  EXPECTED LENGTH OF STAY: 2d 21h  ACTUAL LENGTH OF STAY:          5                 Jolene Suazo NP     I reviewed the patient's medical history, the NP's findings on physical examination, the patient's diagnoses, and treatment plan as documented in the NP note. I concur with the treatment plan as documented. Additional suggestions noted. I have personally seen and examined the patient and agree with the details as noted above.   Disposition limited by daily dressing needs and homelessness. Case management working towards successful planning.

## 2020-03-17 NOTE — PROGRESS NOTES
[facility]      Subjective     No acute events. Wants to go home. No pain complaints. Objective     Patient Vitals for the past 24 hrs:   Temp Pulse Resp BP SpO2   03/17/20 0902 97.3 °F (36.3 °C) 75 16 106/76 99 %   03/16/20 2033 98.4 °F (36.9 °C) 69 14 103/69 96 %   03/16/20 1926     96 %   03/16/20 1408 98.2 °F (36.8 °C) 84 17 99/64 97 %       Date 03/16/20 0700 - 03/17/20 0659 03/17/20 0700 - 03/18/20 0659   Shift 0810-5188 [id number] 24 Hour Total 7173-8636 5872-8165 24 Hour Total   INTAKE   P.O. 720  720        P. O. 720  720      Shift Total(mL/kg) 720(6.9)  720(6.9)      OUTPUT   Shift Total(mL/kg)           720      Weight (kg) 104.2 104.2 104.2 104.2 104.2 104.2       PE  GEN - Awake, alert, communicating appropriately. NAD  Perineum - Bilateral perirectal abscesses s/p I&D. No signficant residual induration or erythema. No fluctuance. Minimal tenderness to palpation around the sites today. No obvious purulence noted or expressed on palpation. Packing sitting outside of wounds bilaterally and no longer covered with gauze.       Labs  Recent Results (from the past 24 hour(s))   GLUCOSE, POC    Collection Time: 03/16/20 11:30 AM   Result Value Ref Range    Glucose (POC) 188 (H) 65 - 100 mg/dL    Performed by Bailey Felix    GLUCOSE, POC    Collection Time: 03/16/20  4:40 PM   Result Value Ref Range    Glucose (POC) 154 (H) 65 - 100 mg/dL    Performed by Paddy Sanders, POC    Collection Time: 03/16/20  6:32 PM   Result Value Ref Range    Glucose (POC) 214 (H) 65 - 100 mg/dL    Performed by Paddy Sanders, POC    Collection Time: 03/16/20 10:07 PM   Result Value Ref Range    Glucose (POC) 180 (H) 65 - 100 mg/dL    Performed by Mellisa Bradshaw, POC    Collection Time: 03/17/20  7:28 AM   Result Value Ref Range    Glucose (POC) 143 (H) 65 - 100 mg/dL    Performed by Alexandro Bruce Assessment     Giacomo Boone is a 44 y. o.yr old female with bilateral perirectal abscesses s/p I&D at [facility]. Plan     - I do not think we need to continue to pack the wounds at this point as they have been well drained. The amount of packing was minimal and not really staying in the wound any more. I would recommend twice daily sitz baths until this is healed and to cover the wounds with clean, dry gauze at least once daily until healed.     - Could be discharged from surgery standpoint.    - Antibiotics per primary team    Brianda Arana MD  3/17/2020  11:26 AM

## 2020-03-17 NOTE — PROGRESS NOTES
Patient resting in bed, appetite is moderate. Problem: Diabetes Self-Management  Goal: *Disease process and treatment process  Description: Define diabetes and identify own type of diabetes; list 3 options for treating diabetes. Outcome: Progressing Towards Goal  Goal: *Incorporating nutritional management into lifestyle  Description: Describe effect of type, amount and timing of food on blood glucose; list 3 methods for planning meals. Outcome: Progressing Towards Goal  Goal: *Incorporating physical activity into lifestyle  Description: State effect of exercise on blood glucose levels. Outcome: Progressing Towards Goal  Goal: *Developing strategies to promote health/change behavior  Description: Define the ABC's of diabetes; identify appropriate screenings, schedule and personal plan for screenings. Outcome: Progressing Towards Goal  Goal: *Using medications safely  Description: State effect of diabetes medications on diabetes; name diabetes medication taking, action and side effects. Outcome: Progressing Towards Goal  Goal: *Monitoring blood glucose, interpreting and using results  Description: Identify recommended blood glucose targets  and personal targets. Outcome: Progressing Towards Goal  Goal: *Prevention, detection, treatment of acute complications  Description: List symptoms of hyper- and hypoglycemia; describe how to treat low blood sugar and actions for lowering  high blood glucose level. Outcome: Progressing Towards Goal  Goal: *Prevention, detection and treatment of chronic complications  Description: Define the natural course of diabetes and describe the relationship of blood glucose levels to long term complications of diabetes.   Outcome: Progressing Towards Goal  Goal: *Developing strategies to address psychosocial issues  Description: Describe feelings about living with diabetes; identify support needed and support network  Outcome: Progressing Towards Goal  Goal: *Insulin pump training  Outcome: Progressing Towards Goal  Goal: *Sick day guidelines  Outcome: Progressing Towards Goal  Goal: *Patient Specific Goal (EDIT GOAL, INSERT TEXT)  Outcome: Progressing Towards Goal     Problem: Patient Education: Go to Patient Education Activity  Goal: Patient/Family Education  Outcome: Progressing Towards Goal     Problem: Falls - Risk of  Goal: *Absence of Falls  Description: Document Michael Whitt Fall Risk and appropriate interventions in the flowsheet. Outcome: Progressing Towards Goal  Note: Fall Risk Interventions:  Mobility Interventions: Communicate number of staff needed for ambulation/transfer    Mentation Interventions: Adequate sleep, hydration, pain control    Medication Interventions: Evaluate medications/consider consulting pharmacy, Patient to call before getting OOB, Teach patient to arise slowly    Elimination Interventions: Patient to call for help with toileting needs, Stay With Me (per policy)              Problem: Patient Education: Go to Patient Education Activity  Goal: Patient/Family Education  Outcome: Progressing Towards Goal     Problem: Pressure Injury - Risk of  Goal: *Prevention of pressure injury  Description: Document Nicholas Scale and appropriate interventions in the flowsheet.   Outcome: Progressing Towards Goal  Note: Pressure Injury Interventions:  Sensory Interventions: Assess changes in LOC    Moisture Interventions: Apply protective barrier, creams and emollients, Absorbent underpads    Activity Interventions: Increase time out of bed, Pressure redistribution bed/mattress(bed type)    Mobility Interventions: Pressure redistribution bed/mattress (bed type)    Nutrition Interventions: Document food/fluid/supplement intake    Friction and Shear Interventions: Apply protective barrier, creams and emollients, Lift sheet, Minimize layers                Problem: Patient Education: Go to Patient Education Activity  Goal: Patient/Family Education  Outcome: Progressing Towards Goal     Problem: Impaired Skin Integrity/Pressure Injury Treatment  Goal: *Improvement of Existing Pressure Injury  Outcome: Progressing Towards Goal  Goal: *Prevention of pressure injury  Description: Document Nicholas Scale and appropriate interventions in the flowsheet.   Outcome: Progressing Towards Goal  Note: Pressure Injury Interventions:  Sensory Interventions: Assess changes in LOC    Moisture Interventions: Apply protective barrier, creams and emollients, Absorbent underpads    Activity Interventions: Increase time out of bed, Pressure redistribution bed/mattress(bed type)    Mobility Interventions: Pressure redistribution bed/mattress (bed type)    Nutrition Interventions: Document food/fluid/supplement intake    Friction and Shear Interventions: Apply protective barrier, creams and emollients, Lift sheet, Minimize layers                Problem: Patient Education: Go to Patient Education Activity  Goal: Patient/Family Education  Outcome: Progressing Towards Goal     Problem: Patient Education: Go to Patient Education Activity  Goal: Patient/Family Education  Outcome: Progressing Towards Goal     Problem: Pain  Goal: *Control of Pain  Outcome: Progressing Towards Goal     Problem: Patient Education: Go to Patient Education Activity  Goal: Patient/Family Education  Outcome: Progressing Towards Goal     Problem: Pain  Goal: *Control of Pain  Outcome: Progressing Towards Goal     Problem: Patient Education: Go to Patient Education Activity  Goal: Patient/Family Education  Outcome: Progressing Towards Goal

## 2020-03-18 ENCOUNTER — HOSPITAL ENCOUNTER (INPATIENT)
Age: 40
LOS: 2 days | Discharge: HOME | DRG: 420 | End: 2020-03-20
Attending: STUDENT IN AN ORGANIZED HEALTH CARE EDUCATION/TRAINING PROGRAM | Admitting: STUDENT IN AN ORGANIZED HEALTH CARE EDUCATION/TRAINING PROGRAM
Payer: MEDICAID

## 2020-03-18 VITALS
TEMPERATURE: 97.9 F | OXYGEN SATURATION: 97 % | SYSTOLIC BLOOD PRESSURE: 100 MMHG | WEIGHT: 222.88 LBS | HEART RATE: 85 BPM | DIASTOLIC BLOOD PRESSURE: 67 MMHG | RESPIRATION RATE: 16 BRPM | BODY MASS INDEX: 35.82 KG/M2 | HEIGHT: 66 IN

## 2020-03-18 LAB
ANION GAP SERPL CALC-SCNC: 3 MMOL/L (ref 5–15)
BUN SERPL-MCNC: 6 MG/DL (ref 6–20)
BUN/CREAT SERPL: 9 (ref 12–20)
CALCIUM SERPL-MCNC: 8.8 MG/DL (ref 8.5–10.1)
CHLORIDE SERPL-SCNC: 104 MMOL/L (ref 97–108)
CO2 SERPL-SCNC: 30 MMOL/L (ref 21–32)
CREAT SERPL-MCNC: 0.66 MG/DL (ref 0.55–1.02)
ERYTHROCYTE [DISTWIDTH] IN BLOOD BY AUTOMATED COUNT: 17.6 % (ref 11.5–14.5)
GLUCOSE BLD STRIP.AUTO-MCNC: 146 MG/DL (ref 65–100)
GLUCOSE BLD STRIP.AUTO-MCNC: 150 MG/DL (ref 65–100)
GLUCOSE BLD STRIP.AUTO-MCNC: 168 MG/DL (ref 65–100)
GLUCOSE SERPL-MCNC: 111 MG/DL (ref 65–100)
HCT VFR BLD AUTO: 33.1 % (ref 35–47)
HGB BLD-MCNC: 10.2 G/DL (ref 11.5–16)
MAGNESIUM SERPL-MCNC: 1.8 MG/DL (ref 1.6–2.4)
MCH RBC QN AUTO: 25.8 PG (ref 26–34)
MCHC RBC AUTO-ENTMCNC: 30.8 G/DL (ref 30–36.5)
MCV RBC AUTO: 83.6 FL (ref 80–99)
NRBC # BLD: 0 K/UL (ref 0–0.01)
NRBC BLD-RTO: 0 PER 100 WBC
PLATELET # BLD AUTO: 452 K/UL (ref 150–400)
PMV BLD AUTO: 9.1 FL (ref 8.9–12.9)
POTASSIUM SERPL-SCNC: 4 MMOL/L (ref 3.5–5.1)
RBC # BLD AUTO: 3.96 M/UL (ref 3.8–5.2)
SERVICE CMNT-IMP: ABNORMAL
SODIUM SERPL-SCNC: 137 MMOL/L (ref 136–145)
WBC # BLD AUTO: 10 K/UL (ref 3.6–11)

## 2020-03-18 PROCEDURE — 74011636637 HC RX REV CODE- 636/637: Performed by: INTERNAL MEDICINE

## 2020-03-18 PROCEDURE — 80048 BASIC METABOLIC PNL TOTAL CA: CPT

## 2020-03-18 PROCEDURE — 74011250637 HC RX REV CODE- 250/637: Performed by: INTERNAL MEDICINE

## 2020-03-18 PROCEDURE — 97161 PT EVAL LOW COMPLEX 20 MIN: CPT

## 2020-03-18 PROCEDURE — 74011250637 HC RX REV CODE- 250/637: Performed by: NURSE PRACTITIONER

## 2020-03-18 PROCEDURE — 82962 GLUCOSE BLOOD TEST: CPT

## 2020-03-18 PROCEDURE — 65270000032 HC RM SEMIPRIVATE

## 2020-03-18 PROCEDURE — 74011250637 HC RX REV CODE- 250/637: Performed by: STUDENT IN AN ORGANIZED HEALTH CARE EDUCATION/TRAINING PROGRAM

## 2020-03-18 PROCEDURE — 97116 GAIT TRAINING THERAPY: CPT

## 2020-03-18 PROCEDURE — 85027 COMPLETE CBC AUTOMATED: CPT

## 2020-03-18 PROCEDURE — 74011250636 HC RX REV CODE- 250/636: Performed by: INTERNAL MEDICINE

## 2020-03-18 PROCEDURE — 83735 ASSAY OF MAGNESIUM: CPT

## 2020-03-18 PROCEDURE — 74011636637 HC RX REV CODE- 636/637: Performed by: STUDENT IN AN ORGANIZED HEALTH CARE EDUCATION/TRAINING PROGRAM

## 2020-03-18 PROCEDURE — 36415 COLL VENOUS BLD VENIPUNCTURE: CPT

## 2020-03-18 PROCEDURE — 74011250636 HC RX REV CODE- 250/636: Performed by: STUDENT IN AN ORGANIZED HEALTH CARE EDUCATION/TRAINING PROGRAM

## 2020-03-18 RX ORDER — PANTOPRAZOLE SODIUM 40 MG/1
40 TABLET, DELAYED RELEASE ORAL
Status: DISCONTINUED | OUTPATIENT
Start: 2020-03-19 | End: 2020-03-20 | Stop reason: HOSPADM

## 2020-03-18 RX ORDER — OXYCODONE HYDROCHLORIDE 5 MG/1
5 TABLET ORAL
Status: DISCONTINUED | OUTPATIENT
Start: 2020-03-18 | End: 2020-03-20 | Stop reason: HOSPADM

## 2020-03-18 RX ORDER — INSULIN GLARGINE 100 [IU]/ML
0.2 INJECTION, SOLUTION SUBCUTANEOUS
Status: DISCONTINUED | OUTPATIENT
Start: 2020-03-18 | End: 2020-03-20 | Stop reason: HOSPADM

## 2020-03-18 RX ORDER — ENOXAPARIN SODIUM 100 MG/ML
40 INJECTION SUBCUTANEOUS EVERY 24 HOURS
Status: DISCONTINUED | OUTPATIENT
Start: 2020-03-19 | End: 2020-03-20 | Stop reason: HOSPADM

## 2020-03-18 RX ORDER — SODIUM CHLORIDE 0.9 % (FLUSH) 0.9 %
5-40 SYRINGE (ML) INJECTION AS NEEDED
Status: DISCONTINUED | OUTPATIENT
Start: 2020-03-18 | End: 2020-03-18 | Stop reason: SDUPTHER

## 2020-03-18 RX ORDER — AMOXICILLIN AND CLAVULANATE POTASSIUM 875; 125 MG/1; MG/1
1 TABLET, FILM COATED ORAL EVERY 12 HOURS
Status: DISCONTINUED | OUTPATIENT
Start: 2020-03-18 | End: 2020-03-20 | Stop reason: HOSPADM

## 2020-03-18 RX ORDER — ALPRAZOLAM 0.25 MG/1
0.25 TABLET ORAL 3 TIMES DAILY
Status: DISCONTINUED | OUTPATIENT
Start: 2020-03-18 | End: 2020-03-20 | Stop reason: HOSPADM

## 2020-03-18 RX ORDER — SODIUM CHLORIDE 0.9 % (FLUSH) 0.9 %
5-40 SYRINGE (ML) INJECTION AS NEEDED
Status: DISCONTINUED | OUTPATIENT
Start: 2020-03-18 | End: 2020-03-20 | Stop reason: HOSPADM

## 2020-03-18 RX ORDER — POLYETHYLENE GLYCOL 3350 17 G/17G
17 POWDER, FOR SOLUTION ORAL
Status: DISCONTINUED | OUTPATIENT
Start: 2020-03-18 | End: 2020-03-20 | Stop reason: HOSPADM

## 2020-03-18 RX ORDER — POLYETHYLENE GLYCOL 3350 17 G/17G
17 POWDER, FOR SOLUTION ORAL
Status: DISCONTINUED | OUTPATIENT
Start: 2020-03-18 | End: 2020-03-18 | Stop reason: HOSPADM

## 2020-03-18 RX ORDER — ONDANSETRON 2 MG/ML
4 INJECTION INTRAMUSCULAR; INTRAVENOUS
Status: DISCONTINUED | OUTPATIENT
Start: 2020-03-18 | End: 2020-03-20 | Stop reason: HOSPADM

## 2020-03-18 RX ORDER — ACETAMINOPHEN 500 MG
1000 TABLET ORAL 3 TIMES DAILY
Status: DISCONTINUED | OUTPATIENT
Start: 2020-03-18 | End: 2020-03-20 | Stop reason: HOSPADM

## 2020-03-18 RX ORDER — INSULIN LISPRO 100 [IU]/ML
4 INJECTION, SOLUTION INTRAVENOUS; SUBCUTANEOUS
Status: DISCONTINUED | OUTPATIENT
Start: 2020-03-18 | End: 2020-03-20 | Stop reason: HOSPADM

## 2020-03-18 RX ORDER — METRONIDAZOLE 250 MG/1
500 TABLET ORAL 2 TIMES DAILY
Status: DISCONTINUED | OUTPATIENT
Start: 2020-03-18 | End: 2020-03-18 | Stop reason: SDUPTHER

## 2020-03-18 RX ORDER — IBUPROFEN 200 MG
1 TABLET ORAL EVERY 24 HOURS
Status: DISCONTINUED | OUTPATIENT
Start: 2020-03-19 | End: 2020-03-20 | Stop reason: HOSPADM

## 2020-03-18 RX ORDER — HYDROCODONE BITARTRATE AND ACETAMINOPHEN 5; 325 MG/1; MG/1
1 TABLET ORAL
Status: DISCONTINUED | OUTPATIENT
Start: 2020-03-18 | End: 2020-03-18

## 2020-03-18 RX ORDER — AMOXICILLIN 250 MG
1 CAPSULE ORAL DAILY
Status: DISCONTINUED | OUTPATIENT
Start: 2020-03-19 | End: 2020-03-20 | Stop reason: HOSPADM

## 2020-03-18 RX ORDER — DEXTROSE MONOHYDRATE 100 MG/ML
0-250 INJECTION, SOLUTION INTRAVENOUS AS NEEDED
Status: DISCONTINUED | OUTPATIENT
Start: 2020-03-18 | End: 2020-03-20 | Stop reason: HOSPADM

## 2020-03-18 RX ORDER — ACETAMINOPHEN 325 MG/1
650 TABLET ORAL
Status: DISCONTINUED | OUTPATIENT
Start: 2020-03-18 | End: 2020-03-18

## 2020-03-18 RX ORDER — METRONIDAZOLE 250 MG/1
500 TABLET ORAL 2 TIMES DAILY
Status: DISCONTINUED | OUTPATIENT
Start: 2020-03-18 | End: 2020-03-20 | Stop reason: HOSPADM

## 2020-03-18 RX ORDER — SODIUM CHLORIDE 0.9 % (FLUSH) 0.9 %
5-40 SYRINGE (ML) INJECTION EVERY 8 HOURS
Status: DISCONTINUED | OUTPATIENT
Start: 2020-03-18 | End: 2020-03-20 | Stop reason: HOSPADM

## 2020-03-18 RX ORDER — AMOXICILLIN 250 MG
1 CAPSULE ORAL DAILY
Status: DISCONTINUED | OUTPATIENT
Start: 2020-03-18 | End: 2020-03-18 | Stop reason: HOSPADM

## 2020-03-18 RX ORDER — MORPHINE SULFATE 2 MG/ML
1 INJECTION, SOLUTION INTRAMUSCULAR; INTRAVENOUS
Status: DISCONTINUED | OUTPATIENT
Start: 2020-03-18 | End: 2020-03-20 | Stop reason: HOSPADM

## 2020-03-18 RX ORDER — IBUPROFEN 400 MG/1
400 TABLET ORAL
Status: DISCONTINUED | OUTPATIENT
Start: 2020-03-18 | End: 2020-03-20 | Stop reason: HOSPADM

## 2020-03-18 RX ORDER — IBUPROFEN 400 MG/1
400 TABLET ORAL
Status: DISCONTINUED | OUTPATIENT
Start: 2020-03-18 | End: 2020-03-18

## 2020-03-18 RX ORDER — MAGNESIUM SULFATE 100 %
4 CRYSTALS MISCELLANEOUS AS NEEDED
Status: DISCONTINUED | OUTPATIENT
Start: 2020-03-18 | End: 2020-03-20 | Stop reason: HOSPADM

## 2020-03-18 RX ORDER — INSULIN LISPRO 100 [IU]/ML
INJECTION, SOLUTION INTRAVENOUS; SUBCUTANEOUS
Status: DISCONTINUED | OUTPATIENT
Start: 2020-03-18 | End: 2020-03-20 | Stop reason: HOSPADM

## 2020-03-18 RX ADMIN — ALPRAZOLAM 0.25 MG: 0.25 TABLET ORAL at 21:51

## 2020-03-18 RX ADMIN — SENNOSIDES AND DOCUSATE SODIUM 1 TABLET: 8.6; 5 TABLET ORAL at 08:38

## 2020-03-18 RX ADMIN — ACETAMINOPHEN 1000 MG: 500 TABLET ORAL at 16:04

## 2020-03-18 RX ADMIN — Medication 10 ML: at 21:52

## 2020-03-18 RX ADMIN — MORPHINE SULFATE 1 MG: 2 INJECTION, SOLUTION INTRAMUSCULAR; INTRAVENOUS at 15:44

## 2020-03-18 RX ADMIN — HYDROCODONE BITARTRATE AND ACETAMINOPHEN 1 TABLET: 5; 325 TABLET ORAL at 14:43

## 2020-03-18 RX ADMIN — AMOXICILLIN AND CLAVULANATE POTASSIUM 1 TABLET: 875; 125 TABLET, FILM COATED ORAL at 08:38

## 2020-03-18 RX ADMIN — ACETAMINOPHEN 1000 MG: 500 TABLET ORAL at 21:51

## 2020-03-18 RX ADMIN — ALPRAZOLAM 0.25 MG: 0.25 TABLET ORAL at 15:44

## 2020-03-18 RX ADMIN — ENOXAPARIN SODIUM 40 MG: 40 INJECTION SUBCUTANEOUS at 08:39

## 2020-03-18 RX ADMIN — INSULIN LISPRO 2 UNITS: 100 INJECTION, SOLUTION INTRAVENOUS; SUBCUTANEOUS at 16:43

## 2020-03-18 RX ADMIN — Medication 10 ML: at 15:44

## 2020-03-18 RX ADMIN — OXYCODONE HYDROCHLORIDE 5 MG: 5 TABLET ORAL at 20:26

## 2020-03-18 RX ADMIN — INSULIN LISPRO 4 UNITS: 100 INJECTION, SOLUTION INTRAVENOUS; SUBCUTANEOUS at 16:43

## 2020-03-18 RX ADMIN — METRONIDAZOLE 500 MG: 250 TABLET ORAL at 08:38

## 2020-03-18 RX ADMIN — INSULIN GLARGINE 19 UNITS: 100 INJECTION, SOLUTION SUBCUTANEOUS at 21:50

## 2020-03-18 RX ADMIN — PANTOPRAZOLE SODIUM 40 MG: 40 TABLET, DELAYED RELEASE ORAL at 07:34

## 2020-03-18 RX ADMIN — ALPRAZOLAM 0.25 MG: 0.25 TABLET ORAL at 08:38

## 2020-03-18 RX ADMIN — MORPHINE SULFATE 1 MG: 2 INJECTION, SOLUTION INTRAMUSCULAR; INTRAVENOUS at 09:25

## 2020-03-18 RX ADMIN — Medication 10 ML: at 07:35

## 2020-03-18 RX ADMIN — INSULIN LISPRO 4 UNITS: 100 INJECTION, SOLUTION INTRAVENOUS; SUBCUTANEOUS at 08:02

## 2020-03-18 RX ADMIN — AMOXICILLIN AND CLAVULANATE POTASSIUM 1 TABLET: 875; 125 TABLET, FILM COATED ORAL at 20:20

## 2020-03-18 RX ADMIN — INSULIN LISPRO 2 UNITS: 100 INJECTION, SOLUTION INTRAVENOUS; SUBCUTANEOUS at 08:02

## 2020-03-18 RX ADMIN — METRONIDAZOLE 500 MG: 250 TABLET ORAL at 16:43

## 2020-03-18 NOTE — PROGRESS NOTES
Bedside shift change report given to Meño Zelaya (oncoming nurse) by Kandice Ackerman RN (offgoing nurse). Report included the following information SBAR, Kardex, MAR and Recent Results.

## 2020-03-18 NOTE — DISCHARGE INSTRUCTIONS
Discharge Instructions       PATIENT ID: Neda Hill  MRN: [id number]   YOB: 1980    DATE OF ADMISSION: 3/12/2020  2:43 AM    DATE OF DISCHARGE: 3/18/2020    PRIMARY CARE PROVIDER: None     ATTENDING PHYSICIAN: Vicente Nichols MD  DISCHARGING PROVIDER: Jeovany Emery NP    To contact this individual call [phone] and ask the  to page. If unavailable ask to be transferred the Adult Hospitalist Department. DISCHARGE DIAGNOSES Altered Mental Status    CONSULTATIONS: IP CONSULT TO GENERAL SURGERY    PROCEDURES/SURGERIES: * No surgery found *    PENDING TEST RESULTS:   At the time of discharge the following test results are still pending: blood culture to be finalized on 3/17/2020    FOLLOW UP APPOINTMENTS:   Follow-up Information    None          ADDITIONAL CARE RECOMMENDATIONS:   Follow up appointments as scheduled  Wound care as instructed  Take home medications as prescribed  Increase activity (exercise) and monitor and decrease sugar intake  Increase water intake (2 to 3L daily)    DIET: Diabetic Diet    ACTIVITY: Activity as tolerated    WOUND CARE: Right and left buttocks:  Cover wound with dry gauze and change daily. EQUIPMENT needed: none      DISCHARGE MEDICATIONS:   See Medication Reconciliation Form    · It is important that you take the medication exactly as they are prescribed. · Keep your medication in the bottles provided by the pharmacist and keep a list of the medication names, dosages, and times to be taken in your wallet. · Do not take other medications without consulting your doctor. NOTIFY YOUR PHYSICIAN FOR ANY OF THE FOLLOWING:   Fever over 101 degrees for 24 hours. Chest pain, shortness of breath, fever, chills, nausea, vomiting, diarrhea, change in mentation, falling, weakness, bleeding. Severe pain or pain not relieved by medications. Or, any other signs or symptoms that you may have questions about.       DISPOSITION:    Home With:   OT  PT [facility]  RN       SNF/Inpatient Rehab/LTAC    Independent/assisted living    Hospice   x Other: Group Facility     CDMP Checked:   Yes x     PROBLEM LIST Updated:  Yes x       Signed:   Haley Mcneil NP  3/16/2020  12:33 PM      Patient Education        Perineal Abscess: Care Instructions  Your Care Instructions  A perineal abscess is an infection that causes a painful lump in the perineum. The perineum is the area between the scrotum and the anus in a man. In a woman, it's the area between the vulva and the anus. The area may look red and feel painful and be swollen. The abscess may form after surgery or after delivery of a baby. It can also be caused by an infection of the prostate gland. The prostate gland is an organ found inside a man's body, just below the bladder. Your doctor may have done minor surgery to open and drain the abscess. You may have had a sedative to help you relax. You may be unsteady after having sedation. It can take a few hours for the medicine's effects to wear off. Common side effects include nausea, vomiting, and feeling sleepy or tired. The doctor has checked you carefully, but problems can develop later. If you notice any problems or new symptoms, get medical treatment right away. Follow-up care is a key part of your treatment and safety. Be sure to make and go to all appointments, and call your doctor if you are having problems. It's also a good idea to know your test results and keep a list of the medicines you take. How can you care for yourself at home? · If your doctor gave you a sedative:  ? For 24 hours, don't do anything that requires attention to detail. This includes going to work, making important decisions, or signing any legal documents. It takes time for the medicine's effects to completely wear off.  ? For your safety, do not drive or operate any machinery that could be dangerous. Wait until the medicine wears off.  You need to be able to think clearly and react easily. · Be safe with medicines. Read and follow all instructions on the label. ? If the doctor gave you a prescription medicine for pain, take it as prescribed. ? If you are not taking a prescription pain medicine, ask your doctor if you can take an over-the-counter medicine. · If your doctor prescribed antibiotics, take them as directed. Do not stop taking them just because you feel better. You need to take the full course of antibiotics. · Sit in a few inches of warm water (sitz bath) 3 times a day and after bowel movements. The warm water helps the area heal and eases discomfort. When should you call for help? Call 911 anytime you think you may need emergency care. For example, call if:    · You have trouble breathing.     · You passed out (lost consciousness).    Call your doctor now or seek immediate medical care if:    · You have symptoms of infection, such as:  ? Increased pain, swelling, warmth, or redness. ? Red streaks leading from the area. ? Pus draining from the area. ? A fever.    Watch closely for changes in your health, and be sure to contact your doctor if:    · You do not get better as expected. Where can you learn more? Go to http://brionna-sigrid.info/  Enter J195 in the search box to learn more about \"Perineal Abscess: Care Instructions. \"  Current as of: August 11, 2019Content Version: 12.4  © 1729-3521 Healthwise, Incorporated. Care instructions adapted under license by [employer] (which disclaims liability or warranty for this information). If you have questions about a medical condition or this instruction, always ask your healthcare professional. [place] [address] any warranty or liability for your use of this information. Patient Education        Noninsulin Medicines for Type 2 Diabetes: Care Instructions  Your Care Instructions    There are different types of noninsulin medicines for diabetes.  Each works in a different way. But they all help you control your blood sugar. Some types help your body make insulin to lower your blood sugar. Others lower how much insulin your body needs. Some can slow how fast your body digests sugars. And some can remove extra glucose through your urine. · Alpha-glucosidase inhibitors. These keep starches from breaking down. This means that they lower the amount of glucose absorbed when you eat. They don't help your body make more insulin. So they will not cause low blood sugar unless you use them with other medicines for diabetes. They include acarbose and miglitol. · DPP-4 inhibitors. These help your body raise the level of insulin after you eat. They also help your body make less of a hormone that raises blood sugar. They include linagliptin, saxagliptin, and sitagliptin. · Incretin hormones (GLP-1 receptor agonists). Your body makes a protein that can raise your insulin level. It also can lower your blood sugar and make you less hungry. You can get shots of hormones that work the same way. They include exenatide and liraglutide. · Meglitinides. These help your body release insulin. They also help slow how your body digests sugars. So they can keep your blood sugar from rising too fast after you eat. They include nateglinide and repaglinide. · Metformin. This lowers how much glucose your liver makes. And it helps you respond better to insulin. It also lowers the amount of stored sugar that your liver releases when you are not eating. · SGLT2 inhibitors. These help to remove extra glucose through your urine. They may also help some people lose weight. They include canagliflozin, dapagliflozin, and empagliflozin. · Sulfonylureas. These help your body release more insulin. Some work for many hours. They can cause low blood sugar if you don't eat as you planned. They include glipizide and glyburide. · Thiazolidinediones. These reduce the amount of blood glucose.  They also help you respond better to insulin. They include pioglitazone and rosiglitazone. You may need to take more than one medicine for diabetes. Two or more medicines may work better to lower your blood sugar level than just one does. Follow-up care is a key part of your treatment and safety. Be sure to make and go to all appointments, and call your doctor if you are having problems. It's also a good idea to know your test results and keep a list of the medicines you take. How can you care for yourself at home? · Eat a healthy diet. Get some exercise each day. This may help you to reduce how much medicine you need. · Do not take other prescription or over-the-counter medicines, vitamins, herbal products, or supplements without talking to your doctor first. Some medicines for type 2 diabetes can cause problems with other medicines or supplements. · Tell your doctor if you plan to get pregnant. Some of these drugs are not safe for pregnant women. · Be safe with medicines. Take your medicines exactly as prescribed. Meglitinides and sulfonylureas can cause your blood sugar to drop very low. Call your doctor if you think you are having a problem with your medicine. · Check your blood sugar often. You can use a glucose monitor. Keeping track can help you know how certain foods, activities, and medicines affect your blood sugar. And it can help you keep your blood sugar from getting so low that it's not safe. When should you call for help? Call 911 anytime you think you may need emergency care. For example, call if:    · You passed out (lost consciousness).     · You are confused or cannot think clearly.     · Your blood sugar is very high or very low.    Watch closely for changes in your health, and be sure to contact your doctor if:    · Your blood sugar stays outside the level your doctor set for you.     · You have any problems. Where can you learn more?   Go to http://brionna-sigrid.info/  Enter H153 in the search box to learn more about \"Noninsulin Medicines for Type 2 Diabetes: Care Instructions. \"  Current as of: December 19, 2019Content Version: 12.4  © 5665-1724 Healthwise, Incorporated. Care instructions adapted under license by [employer] (which disclaims liability or warranty for this information). If you have questions about a medical condition or this instruction, always ask your healthcare professional. [address] any warranty or liability for your use of this information. Patient Education        Learning About Diabetes Food Guidelines  Your Care Instructions    Meal planning is important to manage diabetes. It helps keep your blood sugar at a target level (which you set with your doctor). You don't have to eat special foods. You can eat what your family eats, including sweets once in a while. But you do have to pay attention to how often you eat and how much you eat of certain foods. You may want to work with a dietitian or a certified diabetes educator (CDE) to help you plan meals and snacks. A dietitian or CDE can also help you lose weight if that is one of your goals. What should you know about eating carbs? Managing the amount of carbohydrate (carbs) you eat is an important part of healthy meals when you have diabetes. Carbohydrate is found in many foods. · Learn which foods have carbs. And learn the amounts of carbs in different foods. ? Bread, cereal, pasta, and rice have about 15 grams of carbs in a serving. A serving is 1 slice of bread (1 ounce), ½ cup of cooked cereal, or 1/3 cup of cooked pasta or rice. ? Fruits have 15 grams of carbs in a serving. A serving is 1 small fresh fruit, such as an apple or orange; ½ of a banana; ½ cup of cooked or canned fruit; ½ cup of fruit juice; 1 cup of melon or raspberries; or 2 tablespoons of dried fruit. ? Milk and no-sugar-added yogurt have 15 grams of carbs in a serving.  A serving is 1 cup of milk or 2/3 cup of no-sugar-added yogurt. ? Starchy vegetables have 15 grams of carbs in a serving. A serving is ½ cup of mashed potatoes or sweet potato; 1 cup winter squash; ½ of a small baked potato; ½ cup of cooked beans; or ½ cup cooked corn or green peas. · Learn how much carbs to eat each day and at each meal. A dietitian or CDE can teach you how to keep track of the amount of carbs you eat. This is called carbohydrate counting. · If you are not sure how to count carbohydrate grams, use the Plate Method to plan meals. It is a good, quick way to make sure that you have a balanced meal. It also helps you spread carbs throughout the day. ? Divide your plate by types of foods. Put non-starchy vegetables on half the plate, meat or other protein food on one-quarter of the plate, and a grain or starchy vegetable in the final quarter of the plate. To this you can add a small piece of fruit and 1 cup of milk or yogurt, depending on how many carbs you are supposed to eat at a meal.  · Try to eat about the same amount of carbs at each meal. Do not \"save up\" your daily allowance of carbs to eat at one meal.  · Proteins have very little or no carbs per serving. Examples of proteins are beef, chicken, turkey, fish, eggs, tofu, cheese, cottage cheese, and peanut butter. A serving size of meat is 3 ounces, which is about the size of a deck of cards. Examples of meat substitute serving sizes (equal to 1 ounce of meat) are 1/4 cup of cottage cheese, 1 egg, 1 tablespoon of peanut butter, and ½ cup of tofu. How can you eat out and still eat healthy? · Learn to estimate the serving sizes of foods that have carbohydrate. If you measure food at home, it will be easier to estimate the amount in a serving of restaurant food. · If the meal you order has too much carbohydrate (such as potatoes, corn, or baked beans), ask to have a low-carbohydrate food instead. Ask for a salad or green vegetables.   · If you use insulin, check your blood sugar before and after eating out to help you plan how much to eat in the future. · If you eat more carbohydrate at a meal than you had planned, take a walk or do other exercise. This will help lower your blood sugar. What else should you know? · Limit saturated fat, such as the fat from meat and dairy products. This is a healthy choice because people who have diabetes are at higher risk of heart disease. So choose lean cuts of meat and nonfat or low-fat dairy products. Use olive or canola oil instead of butter or shortening when cooking. · Don't skip meals. Your blood sugar may drop too low if you skip meals and take insulin or certain medicines for diabetes. · Check with your doctor before you drink alcohol. Alcohol can cause your blood sugar to drop too low. Alcohol can also cause a bad reaction if you take certain diabetes medicines. Follow-up care is a key part of your treatment and safety. Be sure to make and go to all appointments, and call your doctor if you are having problems. It's also a good idea to know your test results and keep a list of the medicines you take. Where can you learn more? Go to http://brionna-sigrid.info/  Enter I147 in the search box to learn more about \"Learning About Diabetes Food Guidelines. \"  Current as of: December 19, 2019Content Version: 12.4  © 0347-6524 Healthwise, Incorporated. Care instructions adapted under license by [employer] (which disclaims liability or warranty for this information). If you have questions about a medical condition or this instruction, always ask your healthcare professional. [address] any warranty or liability for your use of this information.

## 2020-03-18 NOTE — PROGRESS NOTES
Patient resting in bed, had a shower today. IV in place as patient is being discharged to [facility], report called, Home Xanax pills sent with ambulance crew. Patient is aware. Problem: Diabetes Self-Management  Goal: *Disease process and treatment process  Description: Define diabetes and identify own type of diabetes; list 3 options for treating diabetes. Outcome: Resolved/Met  Goal: *Incorporating nutritional management into lifestyle  Description: Describe effect of type, amount and timing of food on blood glucose; list 3 methods for planning meals. Outcome: Resolved/Met  Goal: *Incorporating physical activity into lifestyle  Description: State effect of exercise on blood glucose levels. Outcome: Resolved/Met  Goal: *Developing strategies to promote health/change behavior  Description: Define the ABC's of diabetes; identify appropriate screenings, schedule and personal plan for screenings. Outcome: Resolved/Met  Goal: *Using medications safely  Description: State effect of diabetes medications on diabetes; name diabetes medication taking, action and side effects. Outcome: Resolved/Met  Goal: *Monitoring blood glucose, interpreting and using results  Description: Identify recommended blood glucose targets  and personal targets. Outcome: Resolved/Met  Goal: *Prevention, detection, treatment of acute complications  Description: List symptoms of hyper- and hypoglycemia; describe how to treat low blood sugar and actions for lowering  high blood glucose level. Outcome: Resolved/Met  Goal: *Prevention, detection and treatment of chronic complications  Description: Define the natural course of diabetes and describe the relationship of blood glucose levels to long term complications of diabetes.   Outcome: Resolved/Met  Goal: *Developing strategies to address psychosocial issues  Description: Describe feelings about living with diabetes; identify support needed and support network  Outcome: Resolved/Met  Goal: *Insulin pump training  Outcome: Resolved/Met  Goal: *Sick day guidelines  Outcome: Resolved/Met  Goal: *Patient Specific Goal (EDIT GOAL, INSERT TEXT)  Outcome: Resolved/Met     Problem: Patient Education: Go to Patient Education Activity  Goal: Patient/Family Education  Outcome: Resolved/Met     Problem: Falls - Risk of  Goal: *Absence of Falls  Description: Document Ne Fall Risk and appropriate interventions in the flowsheet. Outcome: Resolved/Met  Note: Fall Risk Interventions:  Mobility Interventions: Communicate number of staff needed for ambulation/transfer    Mentation Interventions: Adequate sleep, hydration, pain control    Medication Interventions: Evaluate medications/consider consulting pharmacy    Elimination Interventions: Patient to call for help with toileting needs              Problem: Patient Education: Go to Patient Education Activity  Goal: Patient/Family Education  Outcome: Resolved/Met     Problem: Pressure Injury - Risk of  Goal: *Prevention of pressure injury  Description: Document Nicholas Scale and appropriate interventions in the flowsheet.   Outcome: Resolved/Met  Note: Pressure Injury Interventions:  Sensory Interventions: Assess changes in LOC, Pressure redistribution bed/mattress (bed type)    Moisture Interventions: Apply protective barrier, creams and emollients, Absorbent underpads    Activity Interventions: Increase time out of bed, Pressure redistribution bed/mattress(bed type)    Mobility Interventions: Pressure redistribution bed/mattress (bed type), PT/OT evaluation    Nutrition Interventions: Document food/fluid/supplement intake    Friction and Shear Interventions: Apply protective barrier, creams and emollients, Lift sheet                Problem: Patient Education: Go to Patient Education Activity  Goal: Patient/Family Education  Outcome: Resolved/Met     Problem: Impaired Skin Integrity/Pressure Injury Treatment  Goal: *Improvement of Existing Pressure Injury  Outcome: Resolved/Met  Goal: *Prevention of pressure injury  Description: Document Nicholas Scale and appropriate interventions in the flowsheet.   Outcome: Resolved/Met  Note: Pressure Injury Interventions:  Sensory Interventions: Assess changes in LOC, Pressure redistribution bed/mattress (bed type)    Moisture Interventions: Apply protective barrier, creams and emollients, Absorbent underpads    Activity Interventions: Increase time out of bed, Pressure redistribution bed/mattress(bed type)    Mobility Interventions: Pressure redistribution bed/mattress (bed type), PT/OT evaluation    Nutrition Interventions: Document food/fluid/supplement intake    Friction and Shear Interventions: Apply protective barrier, creams and emollients, Lift sheet                Problem: Patient Education: Go to Patient Education Activity  Goal: Patient/Family Education  Outcome: Resolved/Met     Problem: Patient Education: Go to Patient Education Activity  Goal: Patient/Family Education  Outcome: Resolved/Met     Problem: Pain  Goal: *Control of Pain  Outcome: Resolved/Met     Problem: Patient Education: Go to Patient Education Activity  Goal: Patient/Family Education  Outcome: Resolved/Met     Problem: Pain  Goal: *Control of Pain  Outcome: Resolved/Met     Problem: Patient Education: Go to Patient Education Activity  Goal: Patient/Family Education  Outcome: Resolved/Met

## 2020-03-18 NOTE — PROGRESS NOTES
Bedside shift change report given to [address] (oncoming nurse) by Keila Greer (offgoing nurse). Report included the following information SBAR, Kardex, MAR and Recent Results.

## 2020-03-18 NOTE — PROGRESS NOTES
Problem: Diabetes Self-Management  Goal: *Disease process and treatment process  Description: Define diabetes and identify own type of diabetes; list 3 options for treating diabetes. Outcome: Progressing Towards Goal  Goal: *Incorporating nutritional management into lifestyle  Description: Describe effect of type, amount and timing of food on blood glucose; list 3 methods for planning meals. Outcome: Progressing Towards Goal  Goal: *Incorporating physical activity into lifestyle  Description: State effect of exercise on blood glucose levels. Outcome: Progressing Towards Goal  Goal: *Developing strategies to promote health/change behavior  Description: Define the ABC's of diabetes; identify appropriate screenings, schedule and personal plan for screenings. Outcome: Progressing Towards Goal  Goal: *Using medications safely  Description: State effect of diabetes medications on diabetes; name diabetes medication taking, action and side effects. Outcome: Progressing Towards Goal  Goal: *Monitoring blood glucose, interpreting and using results  Description: Identify recommended blood glucose targets  and personal targets. Outcome: Progressing Towards Goal  Goal: *Prevention, detection, treatment of acute complications  Description: List symptoms of hyper- and hypoglycemia; describe how to treat low blood sugar and actions for lowering  high blood glucose level. Outcome: Progressing Towards Goal  Goal: *Prevention, detection and treatment of chronic complications  Description: Define the natural course of diabetes and describe the relationship of blood glucose levels to long term complications of diabetes.   Outcome: Progressing Towards Goal  Goal: *Developing strategies to address psychosocial issues  Description: Describe feelings about living with diabetes; identify support needed and support network  Outcome: Progressing Towards Goal  Goal: *Insulin pump training  Outcome: Progressing Towards Goal  Goal: *Sick day guidelines  Outcome: Progressing Towards Goal  Goal: *Patient Specific Goal (EDIT GOAL, INSERT TEXT)  Outcome: Progressing Towards Goal     Problem: Patient Education: Go to Patient Education Activity  Goal: Patient/Family Education  Outcome: Progressing Towards Goal     Problem: Patient Education: Go to Patient Education Activity  Goal: Patient/Family Education  Outcome: Progressing Towards Goal

## 2020-03-18 NOTE — PROGRESS NOTES
Problem: Mobility Impaired (Adult and Pediatric)  Goal: *Acute Goals and Plan of Care (Insert Text)  Description: FUNCTIONAL STATUS PRIOR TO ADMISSION: Patient was independent and active without use of DME.    HOME SUPPORT PRIOR TO ADMISSION: The patient lived alone with no local support. Physical Therapy Goals  Initiated 3/18/2020      1. Patient will transfer from bed to chair and chair to bed with modified independence using the least restrictive device within 7 day(s). 2.  Patient will perform sit to stand with modified independence within 7 day(s). 3.  Patient will ambulate with modified independence for 300 feet with the least restrictive device within 7 day(s). 4.  Patient will ascend/descend 10 stairs with handrail(s) with minimal assistance/contact guard assist within 7 day(s). Outcome: Progressing Towards Goal  PHYSICAL THERAPY EVALUATION  Patient: Don Reddy (37 y.o. female)  Date: 3/18/2020  Primary Diagnosis: Abcess        Precautions: fall         ASSESSMENT  Based on the objective data described below, the patient presents with decreased strength, balance and safety awareness. Pt is independent in bed mobility and needed supervision level assistance for transfers and to ambulate 50' to the bathroom and then into the arciniega, Pt is mildly unsteady on her feet and reaches for furniture and walls as she walks. Expect that her gait will improve as her pain decreases. Will follow for a couple of visits and assess her safety on stairs. Current Level of Function Impacting Discharge (mobility/balance): Pt reports that pain level affects her walking    Other factors to consider for discharge: Disposition at Discharge     Patient will benefit from skilled therapy intervention to address the above noted impairments.        PLAN :  Recommendations and Planned Interventions: transfer training, gait training, therapeutic exercises, and therapeutic activities      Frequency/Duration: Patient will be followed by physical therapy:  1 time a week to address goals. Recommendation for discharge: (in order for the patient to meet his/her long term goals)  No skilled physical therapy/ follow up rehabilitation needs identified at this time. This discharge recommendation:  Has not yet been discussed the attending provider and/or case management    IF patient discharges home will need the following DME: none         SUBJECTIVE:   Patient stated I just want to sleep.     OBJECTIVE DATA SUMMARY:   HISTORY:    Past Medical History:   Diagnosis Date    Diabetes ([facility] [address]    No past surgical history on file. Home Situation  Home Environment: Other (comment)    EXAMINATION/PRESENTATION/DECISION MAKING:   Critical Behavior:  Neurologic State: Alert  Orientation Level: Oriented X4    Strength:    Strength: Generally decreased, functional    Functional Mobility:  Bed Mobility:  Rolling: Modified independent  Supine to Sit: Modified independent  Transfers:  Sit to Stand: Supervision  Stand to Sit: Supervision    Balance:   Sitting: Intact  Standing: Impaired  Standing - Static: Good  Standing - Dynamic : Fair  Ambulation/Gait Training:  Distance (ft): 50 Feet (ft)  Gait Abnormalities: Decreased step clearance  Base of Support: Widened  Speed/Elizabeth: Fluctuations  Step Length: Left shortened;Right shortened    Activity Tolerance:   Fair  Please refer to the flowsheet for vital signs taken during this treatment. After treatment patient left in no apparent distress:   Supine in bed and Call bell within reach    COMMUNICATION/EDUCATION:   The patients plan of care was discussed with: Registered nurse. Patient understands intent and goals of therapy, but is neutral about his/her participation.     Thank you for this referral.  Hannah Menjivar, PT   Time Calculation: 25 mins

## 2020-03-18 NOTE — DISCHARGE SUMMARY
Discharge Summary       PATIENT ID: Anna Morales  MRN: [id number]   YOB: 1980    DATE OF ADMISSION: 3/12/2020  2:43 AM    DATE OF DISCHARGE: 03/18/2020   PRIMARY CARE PROVIDER: None     ATTENDING PHYSICIAN: Dr. Tess Arboleda  DISCHARGING PROVIDER: Nhi Simental NP    To contact this individual call [phone] and ask the  to page. If unavailable ask to be transferred the Adult Hospitalist Department. CONSULTATIONS: IP CONSULT TO GENERAL SURGERY    PROCEDURES/SURGERIES: * No surgery found *    ADMITTING [address] [place] [address] COURSE:     Anna Morales is a 44 y.o. female with known hx of DM1, discharged from [facility] on 3/11/20 after I&D for perineal abscesses, incisions on both buttocks, draining pus. Pt is homeless and was discharge to [address] [place] [address]. Pt appears sleepy, dozzing off during the conversation, seems like under the influence. Pt stated to use marijuana. Pt felt very weak and unsteady and could not get up and urinated on self, so called 911 for the help and was brought to the ED. Pt denied any fever, chills, abdominal pain, chest pain, SOB, palpitation, dizziness, lightheadedness, nausea, vomiting. Pt is legally blind in R eye. No visible defects noticeable. No other concerns. General surgery consulted and wound care managed by this service. Patient started on abx and wound care performed daily. Patient became more alert and was back to baseline orientation. She was medically stable and cleared for discharge by all services. Placement issues arose due to patient's current homeless state as well as needs for daily wound care. It was decided to transfer patient to [facility] for long-term wound care until outpatient wound care can be arranged. Patient denies headache, CP, SOB, N/V/D, abd pain at time of discharge. DISCHARGE DIAGNOSES / PLAN:      AMS and weakness, likely THC + related, resolved and at baseline.    UDS Noted + for THC  DC IVF hydration with cleared mentation  PT/OT  Discussed the disposition with patient. Patient to be transferred to [facility] for daily wound management until wound care can be managed outpatient while residing at [facility].      Hyperglycemia in DM1 patient, likely related to noncompliance. Currently well controlled  Overall poorly controled with A1C 11.2  NPH 15u once on admission, less sufficient  Continue Lantus 19u QHS. Working well in keeping BGs in goal range. Serial accuchecks ACHS  DM mx consult, appreciate  Humalog 4u TIDAC and SSI normal resistence  Patient will need to have future discharge medications adjusted to meet needs and hx of poor compliance.     Perineal abscess, s/p I&D at [facility] before DC on Clindamycin 450mg Q6hrs x 10 days on 3/11/20, likely noncompliant  switched to oral antibiotics  Continue Augmentin and metronidazole till 3/20/20  Was dc on Clindamycin from [facility] before admission. GNS on board  Wound care team on board  Tylenol and NSAIDs for pain control  Continue morphine 1 mg IV 15-30 minutes before dressing while wound packing is needed. No opiates on DC     Hypotension, likely hypovolemia and dehydration related to hyperglycemia  Improving/stable  BP still soft  Close monitoring. Pt is not symptomatic. Has intermittent opiates with dressing     Hypoxia, likely related to AMS and possible respiratory depression under the influence  Stable/resolved  RA currently and breathing well     Hx of Bahcet's disease  Stable and not on any immunomodulating agent  If any flare, would consider medrol dose ya or short burst     Dehydration related to hyperglycemia   resolved  Monitor off IVF     Marijuana use  UDS +  Counseled for absteinence  HIV screen per pt request, non-reactive     Hematuria  Improved/resolved  Active menstruation. No symptoms. No further evaluation at present     Patient ok to discharge, awaiting placement at this time.     Emergency contact: Mother: Stalin Post, [phone].  Sister: Latoya Vernell, [phone]        ADDITIONAL CARE RECOMMENDATIONS:   Follow up appointments as scheduled  Wound care as instructed  Take home medications as prescribed  Increase activity (exercise) and monitor and decrease sugar intake  Increase water intake (2 to 3L daily)       PENDING TEST RESULTS:   At the time of discharge the following test results are still pending: none    FOLLOW UP APPOINTMENTS:    Follow-up Information     Follow up With Specialties Details Why Contact Info    Ramez Luna MD Family Practice On 3/23/2020 Hospital f/u PCP appointment Monday, 3/23/20 @ 10:30 a.m. Please arrive 15 minutes early with photo ID, insurance card and a listing of all medications. [id number] [address]  [address] [facility]  [phone]      None    None (395) Patient stated that they have no PCP               DIET: Diabetic Diet    ACTIVITY: Activity as tolerated    WOUND CARE: Right and left buttocks:  Cover wound with dry gauze and change daily. EQUIPMENT needed: none      DISCHARGE MEDICATIONS:  Current Discharge Medication List      START taking these medications    Details   acetaminophen (TYLENOL) 325 mg tablet Take 2 Tabs by mouth every four (4) hours as needed for Pain for up to 30 days. Qty: 120 Tab, Refills: 2      ibuprofen (MOTRIN) 400 mg tablet Take 1 Tab by mouth every eight (8) hours as needed for Pain. Qty: 90 Tab, Refills: 0      amoxicillin-clavulanate (AUGMENTIN) 875-125 mg per tablet Take 1 Tab by mouth every twelve (12) hours for 5 days. Qty: 9 Tab, Refills: 0    Comments: First dose tonight at 2100      polyethylene glycol (Miralax) 17 gram packet Take 1 Packet by mouth two (2) times daily as needed for Constipation. Qty: 30 Packet, Refills: 1      senna-docusate (Senna Plus) 8.6-50 mg per tablet Take 1 Tab by mouth daily as needed for Constipation. Qty: 30 Tab, Refills: 0      metroNIDAZOLE (FLAGYL) 500 mg tablet Take 1 Tab by mouth two (2) times a day for 5 days.   Qty: 9 Tab, Refills: 0         CONTINUE these medications which have CHANGED    Details   citalopram (CELEXA) 20 mg tablet Take 1 Tab by mouth daily. Qty: 30 Tab, Refills: 1      ergocalciferol (ERGOCALCIFEROL) 1,250 mcg (50,000 unit) capsule Take 1 Cap by mouth every seven (7) days. Qty: 5 Cap, Refills: 1      metoprolol succinate (TOPROL-XL) 50 mg XL tablet Take 1 Tab by mouth daily. Qty: 30 Tab, Refills: 1      sacubitriL-valsartan (Entresto) 24-26 mg tablet Take 1 Tab by mouth two (2) times a day. Qty: 60 Tab, Refills: 1      SITagliptin-metFORMIN (JANUMET) 50-1,000 mg per tablet Take 1 Tab by mouth two (2) times daily (with meals). Qty: 60 Tab, Refills: 1      ALPRAZolam (XANAX) 0.5 mg tablet Take 1 Tab by mouth three (3) times daily as needed for Anxiety. Max Daily Amount: 1.5 mg.  Qty: 60 Tab, Refills: 1    Associated Diagnoses: Perirectal abscess         STOP taking these medications       miconazole (MICOTIN) 200 mg vaginal suppository Comments:   Reason for Stopping:                 NOTIFY YOUR PHYSICIAN FOR ANY OF THE FOLLOWING:   Fever over 101 degrees for 24 hours. Chest pain, shortness of breath, fever, chills, nausea, vomiting, diarrhea, change in mentation, falling, weakness, bleeding. Severe pain or pain not relieved by medications. Or, any other signs or symptoms that you may have questions about. DISPOSITION:    Home With:   OT  PT  HH  RN       Long term SNF/Inpatient Rehab    Independent/assisted living    Hospice   x Other: [facility]       PATIENT CONDITION AT DISCHARGE:     Functional status    Poor     Deconditioned    x Independent      Cognition   x  Lucid     Forgetful     Dementia      Catheters/lines (plus indication)    Jarrell     PICC     PEG    x None      Code status   x  Full code     DNR      PHYSICAL EXAMINATION AT DISCHARGE:  General:          Alert, cooperative, no distress, appears stated age. Neck:               Symmetrical  Lungs:             Clear to auscultation bilaterally.   No Wheezing or Rhonchi. No rales. Heart:              Regular  rhythm,  No  murmur   No edema  Abdomen:        Soft, non-tender. Not distended. Extremities:     No cyanosis. No clubbing,                            Skin turgor normal, Capillary refill normal  Skin:                Not pale. Not Jaundiced  No rashes, dressing on wounds of buttocks clean and intact. Psych:             Not anxious or agitated.   Neurologic:      Alert, moves all extremities, answers questions appropriately and responds to commands       CHRONIC MEDICAL DIAGNOSES:  Problem List as of 3/18/2020 Date Reviewed: 3/16/2020          Codes Class Noted - Resolved    Perianal cellulitis ICD-10-CM: K61.0  ICD-9-CM: 864  3/12/2020 - Present        Weakness ICD-10-CM: R53.1  ICD-9-CM: 780.79  3/12/2020 - Present        Hyperglycemia ICD-10-CM: R73.9  ICD-9-CM: 790.29  3/12/2020 - Present        RESOLVED: Hypoxia ICD-10-CM: R09.02  ICD-9-CM: 799.02  3/12/2020 - 3/16/2020              Greater than 35 minutes were spent with the patient on counseling and coordination of care    Signed:   Sixto Romano NP  3/18/2020  8:30 AM

## 2020-03-18 NOTE — PROGRESS NOTES
Wound care completed to wounds on buttocks bilaterally per wound nurse orders/communication. Pt req PRN morphine prior to procedure. Tolerated well. Pt compliant w/ freq repositioning while in bed. Strong smell of urine noted. Pt is refusing to bathe at this time, stating \"she'll do it tomorrow. \" Will re-attempt.

## 2020-03-18 NOTE — PROGRESS NOTES
Transfer pt recv'd from [facility] via EMS. Transferred from stretcher to bed w/ no assist needed. Rm air, No infusions. Rating pain to wound on buttocks at 8/10. Awaiting orders. Provider notified of pt's arrival. Oriented to room. Call bell in reach.

## 2020-03-18 NOTE — PROGRESS NOTES
D/w Dr. Tamie Easton at [facility] this am. Pt accepted to transfer to [facility] for further care. Will work on DC for transfer there before noon if possible. Floor team updated. Appreciate CM assistance in arranging and facilitating this.

## 2020-03-18 NOTE — PROGRESS NOTES
Reviewed chart for transitions of care,and discussed in rounds. Patient will be transferring to [facility] today via [facility] transport at 1030 a.m. Report can be called to [phone]. EMTALA. Folder placed on chart.     Matt Shah [facility]

## 2020-03-18 NOTE — PROGRESS NOTES
[facility] Coordinator for Care Management. Dr. Abdoulaye Pichardo has accepted the patient for transfer today. EMTLA continue Medical care   Nursing Report to  [phone] room to be assigned at that time. Transportation patient to arrive before 4:30PM;  CM report t o me. Review of the chart. Patient is homeless, + drug screen. Recently discharged from [facility]. Will need Wound Care /montitoring until able to transition to a lower level of care. I have asked CM to arrange provider follow-up for patient and  Notify us. Will reach back out to the [facility] to re-evaluate patient  Once we have assess her wound care needs post continue treatment here within the next week.     [address] ADRIÁN RN   [phone]

## 2020-03-18 NOTE — H&P
Hospitalist Admission Note    NAME: Spenser Howard   :  1980   MRN:  [id number]   Room Number: 150/84  @ [facility]     Date/Time:  3/18/2020 2:08 PM    Patient PCP: None  ______________________________________________________________________  Given the patient's current clinical presentation, I have a high level of concern for decompensation if discharged from the emergency department. Complex decision making was performed, which includes reviewing the patient's available past medical records, laboratory results, and x-ray films. My assessment of this patient's clinical condition and my plan of care is as follows. Assessment / Plan:    Perineal abscess :  s/p I&D at [facility] :before DC on Clindamycin 450mg Q6hrs x 10 days on 3/11/20, likely noncompliant. Reviewed CT Abd/pelvis 3/12 -> shows perianal cellulitis, no abscess. No further surgical intervention recommended by Gen surgery at [facility].     - Continue Augmentin and metronidazole till 3/20/20  -Acetaminophen 1 g TID. Ibuprofen 400 mg TID every 6 hours as needed for mild-mod pain.   -Continue morphine 1 mg IV BID 15-30 minutes before dressing while wound packing is needed.    - Wound care consult  - Sitz bath BID  - Glycemic management        Uncontrolled type 2 diabetes, without long term current use of insulin  Lab Results   Component Value Date/Time    Hemoglobin A1c 11.2 (H) 2020 06:39 AM     Seen by diabetes treatment center at [facility].     - Continue 19 units glargine daily, lispro 4 units TID AC, Lispro correctional scale AC HS  - Consistent carb diet  - hypoglycemia protocol  - [facility] has recommended Metformin 1000 mg BID, consider adding amaryl.          Normochromic normocytic anemia     - Check iron panel, b12,folate,retic count              Hx of Behcet's disease  Stable and not on any immunomodulating agent            Marijuana abuse  UDS +    - Patient was counseled extensively on the need to abstain from drugs its addictive tendencies, its deleterious effects on the brain, cardiovascular system, lungs as well as its financial & social sequelae      Body mass index is 35.97 kg/m². Obesity, morbid  Counseled on Lifestyle modifications, AHA Diet ,weight loss strategies. Code Status: full   Surrogate Decision Maker:  Name: Joann Orourke Phone: [phone] Rel: Mother       DVT Prophylaxis: Lovenox  GI Prophylaxis: not indicated  Lines : none  Drains : none  Jarrell : none  Restraints : none  Wounds : bilateral buttocks, left cheek  Baseline: ambulatory independently        Subjective:   CHIEF COMPLAINT: wound care     HISTORY OF PRESENT ILLNESS:     Michelle Parker is a 44 y.o.  female with PMH of type 2 diabetes, marijuana abuse who initially presented to [facility] on 3/12/2020 with altered mental status likely due to [facility] abuse. Hospital course was complicated by Acute hypoxic respiratory failure likely d/t respiratory depression, uncontrolled diabetes w/ hyperglycemia due to medication non compliance, hypotension due to hypovolemia d/t osmotic diuresis from hyperglycemia, Dehydration d/t hyperglycemia which have since resolved. She has had perianal abscess s/p I&D at [facility] in early march & discharged on Clindamycin. CT abd/pelvis on admission revealed perianal cellulitis without abscess. General surgery was consulted - did not recommend further surgical intervention. PAtient was treated with augmentin+metronidazole and wound care. She is homeless and unable to effectively perform wound care due to location of the wound. She has been transferred to [facility] for wound care and antibiotics until outpatient wound care can be arranged. She currently reports mild pain in bilateral buttocks. We were asked to admit for work up and evaluation of the above problems.      Past Medical History:   Diagnosis Date    Diabetes ([facility] [address]         No past surgical history on file.    Social History     Tobacco Use    Smoking status: Current Every Day Smoker     Packs/day: 1.00    Smokeless tobacco: Never Used   Substance Use Topics    Alcohol use: Never     Frequency: Never        No family history on file. No Known Allergies     Prior to Admission medications    Medication Sig Start Date End Date Taking? Authorizing Provider   citalopram (CELEXA) 20 mg tablet Take 1 Tab by mouth daily. 3/16/20   Estrella Dhillon NP   ergocalciferol (ERGOCALCIFEROL) 1,250 mcg (50,000 unit) capsule Take 1 Cap by mouth every seven (7) days. 3/16/20   Armand Castillo I NP   metoprolol succinate (TOPROL-XL) 50 mg XL tablet Take 1 Tab by mouth daily. 3/16/20   Armand Castillo I NP   sacubitriL-valsartan Aundreliza Yanezk) 24-26 mg tablet Take 1 Tab by mouth two (2) times a day. 3/16/20   Armand Castillo I NP   SITagliptin-metFORMIN (JANUMET) 50-1,000 mg per tablet Take 1 Tab by mouth two (2) times daily (with meals). 3/16/20   Estrella Dhillon NP   ALPRAZolam Christina Rudolphlard) 0.5 mg tablet Take 1 Tab by mouth three (3) times daily as needed for Anxiety. Max Daily Amount: 1.5 mg. 3/16/20   Armand Castillo I NP   acetaminophen (TYLENOL) 325 mg tablet Take 2 Tabs by mouth every four (4) hours as needed for Pain for up to 30 days. 3/16/20 4/15/20  Armand Castillo I NP   miconazole (MICOTIN) 200 mg vaginal suppository Insert 1 Suppository into vagina nightly for 1 day. Indications: a yeast infection of the vagina and vulva 3/16/20 3/17/20  Armand Castillo I NP   ibuprofen (MOTRIN) 400 mg tablet Take 1 Tab by mouth every eight (8) hours as needed for Pain. 3/16/20   Armand Castillo I, NP   amoxicillin-clavulanate (AUGMENTIN) 875-125 mg per tablet Take 1 Tab by mouth every twelve (12) hours for 5 days. 3/16/20 3/21/20  Armand Castillo I, NP   polyethylene glycol (Miralax) 17 gram packet Take 1 Packet by mouth two (2) times daily as needed for Constipation.  3/16/20   Armand Castillo I, NP   senna-docusate (Senna Plus) 8.6-50 mg per tablet Take 1 Tab by mouth daily as needed for Constipation. 3/16/20   Larry MAJOR NP   metroNIDAZOLE (FLAGYL) 500 mg tablet Take 1 Tab by mouth two (2) times a day for 5 days. 3/16/20 3/21/20  Ania Campos NP       REVIEW OF SYSTEMS:       Total of 12 systems reviewed as follows:       POSITIVE= underlined text  Negative = text not underlined  General:  fever, chills, sweats, generalized weakness, weight loss/gain,      loss of appetite   Eyes:    blurred vision, eye pain, loss of vision, double vision  ENT:    rhinorrhea, pharyngitis   Respiratory:   cough, sputum production, SOB, THIBODEAUX, wheezing, pleuritic pain   Cardiology:   chest pain, palpitations, orthopnea, PND, edema, syncope   Gastrointestinal:  abdominal pain , N/V, diarrhea, dysphagia, constipation, bleeding   Genitourinary:  + buttock pain. frequency, urgency, dysuria, hematuria, incontinence   Muskuloskeletal :  arthralgia, myalgia, back pain  Hematology:  easy bruising, nose or gum bleeding, lymphadenopathy   Dermatological: rash, ulceration, pruritis, color change / jaundice  Endocrine:   hot flashes or polydipsia   Neurological:  headache, dizziness, confusion, focal weakness, paresthesia,     Speech difficulties, memory loss, gait difficulty  Psychological: Feelings of anxiety, depression, agitation    Objective:   VITALS:    Visit Vitals  BP 98/69 (BP 1 Location: Left arm, BP Patient Position: Lying right side)   Pulse 73   Temp 97.9 °F (36.6 °C)   Resp 18   Ht 5' 6\" (1.676 m)   SpO2 99%   BMI 35.97 kg/m²       PHYSICAL EXAM:    General:    Alert, cooperative, no distress, appears stated age. HEENT: Atraumatic, anicteric sclerae, pink conjunctivae     No oral ulcers, mucosa moist, throat clear, dentition fair  Neck:  Supple, symmetrical,  thyroid: non tender  Lungs:   Clear to auscultation bilaterally. No Wheezing or Rhonchi. No rales. Chest wall:  No tenderness  No Accessory muscle use.   Heart:   Regular  rhythm,  No  murmur   No edema  Abdomen:   Soft, non-tender. Not distended. Bowel sounds normal  Extremities: No cyanosis. No clubbing,      Skin turgor normal, Capillary refill normal, Radial dial pulse 2+  Skin:     Not pale. Not Jaundiced  Erythema on bilateral buttocks. Psych:  Good insight. Not depressed. Not anxious or agitated. Neurologic: EOMs intact. No facial asymmetry. No aphasia or slurred speech. Symmetrical strength, Sensation grossly intact. Alert and oriented X 4.     ______________________________________________________________________    Care Plan discussed with:  Patient/Family, Nurse and     Expected  Disposition:  Home w/Family  ________________________________________________________________________  TOTAL TIME:  40 Minutes    Critical Care Provided     Minutes non procedure based      Comments     Reviewed previous records   >50% of visit spent in counseling and coordination of care  Discussion with patient and/or family and questions answered       ________________________________________________________________________  Signed: Jordin Grayson MD    Procedures: see electronic medical records for all procedures/Xrays and details which were not copied into this note but were reviewed prior to creation of Plan.     LAB DATA REVIEWED:    Recent Results (from the past 24 hour(s))   GLUCOSE, POC    Collection Time: 03/17/20  4:59 PM   Result Value Ref Range    Glucose (POC) 104 (H) 65 - 100 mg/dL    Performed by Marsha Hargrove    GLUCOSE, POC    Collection Time: 03/17/20  9:17 PM   Result Value Ref Range    Glucose (POC) 114 (H) 65 - 100 mg/dL    Performed by Jessica Valladares    CBC W/O DIFF    Collection Time: 03/18/20  4:28 AM   Result Value Ref Range    WBC 10.0 3.6 - 11.0 K/uL    RBC 3.96 3.80 - 5.20 M/uL    HGB 10.2 (L) 11.5 - 16.0 g/dL    HCT 33.1 (L) 35.0 - 47.0 %    MCV 83.6 80.0 - 99.0 FL    MCH 25.8 (L) 26.0 - 34.0 PG    MCHC 30.8 30.0 - 36.5 g/dL    RDW 17.6 (H) 11.5 - 14.5 % PLATELET 358 (H) 246 - 400 K/uL    MPV 9.1 8.9 - 12.9 FL    NRBC 0.0 0  WBC    ABSOLUTE NRBC 0.00 0.00 - 5.43 K/uL   METABOLIC PANEL, BASIC    Collection Time: 03/18/20  4:28 AM   Result Value Ref Range    Sodium 137 136 - 145 mmol/L    Potassium 4.0 3.5 - 5.1 mmol/L    Chloride 104 97 - 108 mmol/L    CO2 30 21 - 32 mmol/L    Anion gap 3 (L) 5 - 15 mmol/L    Glucose 111 (H) 65 - 100 mg/dL    BUN 6 6 - 20 MG/DL    Creatinine 0.66 0.55 - 1.02 MG/DL    BUN/Creatinine ratio 9 (L) 12 - 20      GFR est AA >60 >60 ml/min/1.73m2    GFR est non-AA >60 >60 ml/min/1.73m2    Calcium 8.8 8.5 - 10.1 MG/DL   MAGNESIUM    Collection Time: 03/18/20  4:28 AM   Result Value Ref Range    Magnesium 1.8 1.6 - 2.4 mg/dL   GLUCOSE, POC    Collection Time: 03/18/20  7:28 AM   Result Value Ref Range    Glucose (POC) 146 (H) 65 - 100 mg/dL    Performed by Melissa Vickers

## 2020-03-18 NOTE — PROGRESS NOTES
[facility]      Subjective     No acute events. No new complaints. Plans for transfer to [facility] today. Objective     Patient Vitals for the past 24 hrs:   Temp Pulse Resp BP SpO2   03/18/20 0848 97.9 °F (36.6 °C) 85 16 100/67 97 %   03/18/20 0256 98.7 °F (37.1 °C) 75 14 105/70 97 %   03/17/20 2025 98.6 °F (37 °C) 81 18 103/69 96 %   03/17/20 1410 98.2 °F (36.8 °C) 74 16 108/78 98 %   03/17/20 0902 97.3 °F (36.3 °C) 75 16 106/76 99 %           PE  GEN - Awake, alert, communicating appropriately. NAD  Perineum - Bilateral perirectal abscesses s/p I&D. No signficant residual induration or erythema. No fluctuance or drainage noted. Minimal tenderness to palpation around the sites. .       Labs  Recent Results (from the past 24 hour(s))   GLUCOSE, POC    Collection Time: 03/17/20 11:33 AM   Result Value Ref Range    Glucose (POC) 127 (H) 65 - 100 mg/dL    Performed by Gama Talavera, POC    Collection Time: 03/17/20  4:59 PM   Result Value Ref Range    Glucose (POC) 104 (H) 65 - 100 mg/dL    Performed by Marsha Hargrove    GLUCOSE, POC    Collection Time: 03/17/20  9:17 PM   Result Value Ref Range    Glucose (POC) 114 (H) 65 - 100 mg/dL    Performed by Jessica Valladares    CBC W/O DIFF    Collection Time: 03/18/20  4:28 AM   Result Value Ref Range    WBC 10.0 3.6 - 11.0 K/uL    RBC 3.96 3.80 - 5.20 M/uL    HGB 10.2 (L) 11.5 - 16.0 g/dL    HCT 33.1 (L) 35.0 - 47.0 %    MCV 83.6 80.0 - 99.0 FL    MCH 25.8 (L) 26.0 - 34.0 PG    MCHC 30.8 30.0 - 36.5 g/dL    RDW 17.6 (H) 11.5 - 14.5 %    PLATELET 047 (H) 371 - 400 K/uL    MPV 9.1 8.9 - 12.9 FL    NRBC 0.0 0  WBC    ABSOLUTE NRBC 0.00 0.00 - 2.74 K/uL   METABOLIC PANEL, BASIC    Collection Time: 03/18/20  4:28 AM   Result Value Ref Range    Sodium 137 136 - 145 mmol/L    Potassium 4.0 3.5 - 5.1 mmol/L    Chloride 104 97 - 108 mmol/L    CO2 30 21 - 32 mmol/L    Anion gap 3 (L) 5 - 15 mmol/L    Glucose 111 (H) 65 - 100 mg/dL    BUN 6 6 - 20 MG/DL    Creatinine 0.66 0.55 - 1.02 MG/DL    BUN/Creatinine ratio 9 (L) 12 - 20      GFR est AA >60 >60 ml/min/1.73m2    GFR est non-AA >60 >60 ml/min/1.73m2    Calcium 8.8 8.5 - 10.1 MG/DL   MAGNESIUM    Collection Time: 03/18/20  4:28 AM   Result Value Ref Range    Magnesium 1.8 1.6 - 2.4 mg/dL   GLUCOSE, POC    Collection Time: 03/18/20  7:28 AM   Result Value Ref Range    Glucose (POC) 146 (H) 65 - 100 mg/dL    Performed by Yuliet Paredes is a 44 y. o.yr old female with bilateral perirectal abscesses s/p I&D at [facility]. Plan     - I do not think we need to continue to pack the wounds at this point as they have been well drained. I would recommend twice daily sitz baths until this is healed and to cover the wounds with clean, dry gauze at least once daily until healed.     - plan for transfer to [facility] per primary team   - Antibiotics per primary team    Andrés Leavitt MD  3/18/2020  8:52 AM

## 2020-03-18 NOTE — ROUTINE PROCESS
Wound care consult from [address] [place] [address]. Chart reviewed and assessed the patient. This is a 44year old female patient transferred from [facility] for the continuation of care.      Assessment done of  Her right and left buttock abscess sites that were I&D done at [facility] recently. Patient was discharged from [facility] on 3.11.2020.     Chart reviewed. Admitted DX:  Hypoxia; Perianal cellulitis; Hyperglycemia; Weakness  Past Medical History:  DM; legally blind in Right eye; homeless     Assessment:   Patient is A&O x 3, communicative and mobile independently in bed. Bed: [address] [employer]  Patient says she wears briefs for incontinence. She smells of urine   Patient reports pain to the buttocks. Heels intact without erythema. Sacrum intact without erythema.     1. POA Left buttock, s/p I&D abscess site: 0.5 x 1.8 x 4 cm; Unable to see depth of wound; there is yellow slough coming out of wound; moderate amount of purulent drainage; no odor; induration and red tenderness to the periwound. 2.  POA Right buttock, s/p I&D abscess site:  0.5 x 1.1 x 3 cm; unable to see depth of wound; the area that is visible is pink; small purulent drainage; no odor; induration and red tenderness to the periwound.     Wound, Pressure Prevention & Skin Care Recommendations:    1. Please useone layer of linen/pads under patient to optimize support surface. 2.   Encourage the patient  To Turn/reposition approximately every 2 hours. 3.   Right and left buttocks:  Pack with Aquacel AG  Strip  ensure that an ample amount is outside of the wound; cover with foam dressing and secure with medi pore tape.     Discussed the plan with patient and Stefani DIAL.     Transition of Care:  Will follow as needed while admitted to hospital.    Mark Guthrie [facility]

## 2020-03-18 NOTE — PROGRESS NOTES
Spiritual Care Assessment/Progress Note  [facility]      NAME: Haylee Goddard      MRN: [id number]  AGE: 44 y.o. SEX: female  Synagogue Affiliation: Unknown   Language: English     3/18/2020     Total Time (in minutes): 5     Spiritual Assessment begun in [facility] SURG through conversation with:         []Patient        [] Family    [] Friend(s)        Reason for Consult: Initial visit     Spiritual beliefs: (Please include comment if needed)     [] Identifies with a michela tradition:         [] Supported by a michela community:            [] Claims no spiritual orientation:           [] Seeking spiritual identity:                [] Adheres to an individual form of spirituality:           [x] Not able to assess:                           Identified resources for coping:      [] Prayer                               [] Music                  [] Guided Imagery     [] Family/friends                 [] Pet visits     [] Devotional reading                         [x] Unknown     [] Other:                                            Interventions offered during this visit: (See comments for more details)    Patient Interventions: Initial visit           Plan of Care:     [] Support spiritual and/or cultural needs    [] Support AMD and/or advance care planning process      [] Support grieving process   [] Coordinate Rites and/or Rituals    [] Coordination with community clergy   [] No spiritual needs identified at this time   [] Detailed Plan of Care below (See Comments)  [] Make referral to Music Therapy  [] Make referral to Pet Therapy     [] Make referral to Addiction services  [] Make referral to [facility]  [] Make referral to Spiritual Care Partner  [] No future visits requested        [x] Follow up visits as needed     Attempted to visit pt without success. Pt sleeping and no family present. Chaplains will continue to offer support as needed.   Chaplain Nuñez MDiv, MS, [facility]  [phone])

## 2020-03-18 NOTE — PROGRESS NOTES
Problem: Pain  Goal: *Control of Pain  Outcome: Not Progressing Towards Goal     Problem: Patient Education: Go to Patient Education Activity  Goal: Patient/Family Education  Outcome: Not Progressing Towards Goal     Problem: Pain  Goal: *Control of Pain  Outcome: Not Progressing Towards Goal

## 2020-03-19 PROBLEM — K61.0 PERIANAL ABSCESS: Status: ACTIVE | Noted: 2020-03-19

## 2020-03-19 LAB
ANION GAP SERPL CALC-SCNC: 7 MMOL/L (ref 5–15)
BASOPHILS # BLD: 0 K/UL (ref 0–0.1)
BASOPHILS NFR BLD: 0 % (ref 0–1)
BUN SERPL-MCNC: 10 MG/DL (ref 6–20)
BUN/CREAT SERPL: 15 (ref 12–20)
CALCIUM SERPL-MCNC: 8.9 MG/DL (ref 8.5–10.1)
CHLORIDE SERPL-SCNC: 104 MMOL/L (ref 97–108)
CO2 SERPL-SCNC: 27 MMOL/L (ref 21–32)
CREAT SERPL-MCNC: 0.67 MG/DL (ref 0.55–1.02)
DIFFERENTIAL METHOD BLD: ABNORMAL
EOSINOPHIL # BLD: 0.1 K/UL (ref 0–0.4)
EOSINOPHIL NFR BLD: 2 % (ref 0–7)
ERYTHROCYTE [DISTWIDTH] IN BLOOD BY AUTOMATED COUNT: 17.6 % (ref 11.5–14.5)
FOLATE SERPL-MCNC: 8.8 NG/ML (ref 5–21)
GLUCOSE BLD STRIP.AUTO-MCNC: 145 MG/DL (ref 65–100)
GLUCOSE BLD STRIP.AUTO-MCNC: 149 MG/DL (ref 65–100)
GLUCOSE BLD STRIP.AUTO-MCNC: 150 MG/DL (ref 65–100)
GLUCOSE BLD STRIP.AUTO-MCNC: 160 MG/DL (ref 65–100)
GLUCOSE SERPL-MCNC: 118 MG/DL (ref 65–100)
HCT VFR BLD AUTO: 33.7 % (ref 35–47)
HGB BLD-MCNC: 10.3 G/DL (ref 11.5–16)
IMM GRANULOCYTES # BLD AUTO: 0 K/UL (ref 0–0.04)
IMM GRANULOCYTES NFR BLD AUTO: 1 % (ref 0–0.5)
IRON SATN MFR SERPL: 30 % (ref 20–50)
IRON SERPL-MCNC: 69 UG/DL (ref 35–150)
LYMPHOCYTES # BLD: 2.7 K/UL (ref 0.8–3.5)
LYMPHOCYTES NFR BLD: 46 % (ref 12–49)
MAGNESIUM SERPL-MCNC: 1.7 MG/DL (ref 1.6–2.4)
MCH RBC QN AUTO: 25.6 PG (ref 26–34)
MCHC RBC AUTO-ENTMCNC: 30.6 G/DL (ref 30–36.5)
MCV RBC AUTO: 83.8 FL (ref 80–99)
MONOCYTES # BLD: 0.3 K/UL (ref 0–1)
MONOCYTES NFR BLD: 5 % (ref 5–13)
NEUTS SEG # BLD: 2.7 K/UL (ref 1.8–8)
NEUTS SEG NFR BLD: 46 % (ref 32–75)
NRBC # BLD: 0 K/UL (ref 0–0.01)
NRBC BLD-RTO: 0 PER 100 WBC
PHOSPHATE SERPL-MCNC: 4.3 MG/DL (ref 2.6–4.7)
PLATELET # BLD AUTO: 473 K/UL (ref 150–400)
PMV BLD AUTO: 9.1 FL (ref 8.9–12.9)
POTASSIUM SERPL-SCNC: 4.2 MMOL/L (ref 3.5–5.1)
RBC # BLD AUTO: 4.02 M/UL (ref 3.8–5.2)
RETICS # AUTO: 0.13 M/UL (ref 0.02–0.08)
RETICS/RBC NFR AUTO: 3.2 % (ref 0.7–2.1)
SERVICE CMNT-IMP: ABNORMAL
SODIUM SERPL-SCNC: 138 MMOL/L (ref 136–145)
TIBC SERPL-MCNC: 233 UG/DL (ref 250–450)
VIT B12 SERPL-MCNC: 661 PG/ML (ref 193–986)
WBC # BLD AUTO: 5.8 K/UL (ref 3.6–11)

## 2020-03-19 PROCEDURE — 84100 ASSAY OF PHOSPHORUS: CPT

## 2020-03-19 PROCEDURE — 83735 ASSAY OF MAGNESIUM: CPT

## 2020-03-19 PROCEDURE — 74011250636 HC RX REV CODE- 250/636: Performed by: STUDENT IN AN ORGANIZED HEALTH CARE EDUCATION/TRAINING PROGRAM

## 2020-03-19 PROCEDURE — 85045 AUTOMATED RETICULOCYTE COUNT: CPT

## 2020-03-19 PROCEDURE — 85025 COMPLETE CBC W/AUTO DIFF WBC: CPT

## 2020-03-19 PROCEDURE — 74011250637 HC RX REV CODE- 250/637: Performed by: STUDENT IN AN ORGANIZED HEALTH CARE EDUCATION/TRAINING PROGRAM

## 2020-03-19 PROCEDURE — 83540 ASSAY OF IRON: CPT

## 2020-03-19 PROCEDURE — 97165 OT EVAL LOW COMPLEX 30 MIN: CPT | Performed by: OCCUPATIONAL THERAPIST

## 2020-03-19 PROCEDURE — 82607 VITAMIN B-12: CPT

## 2020-03-19 PROCEDURE — 74011636637 HC RX REV CODE- 636/637: Performed by: STUDENT IN AN ORGANIZED HEALTH CARE EDUCATION/TRAINING PROGRAM

## 2020-03-19 PROCEDURE — 82746 ASSAY OF FOLIC ACID SERUM: CPT

## 2020-03-19 PROCEDURE — 82962 GLUCOSE BLOOD TEST: CPT

## 2020-03-19 PROCEDURE — 80048 BASIC METABOLIC PNL TOTAL CA: CPT

## 2020-03-19 PROCEDURE — 36415 COLL VENOUS BLD VENIPUNCTURE: CPT

## 2020-03-19 PROCEDURE — 65220000003 HC RM SEMIPRIVATE PSYCH

## 2020-03-19 RX ADMIN — INSULIN LISPRO 4 UNITS: 100 INJECTION, SOLUTION INTRAVENOUS; SUBCUTANEOUS at 12:34

## 2020-03-19 RX ADMIN — INSULIN LISPRO 2 UNITS: 100 INJECTION, SOLUTION INTRAVENOUS; SUBCUTANEOUS at 21:26

## 2020-03-19 RX ADMIN — ALPRAZOLAM 0.25 MG: 0.25 TABLET ORAL at 15:27

## 2020-03-19 RX ADMIN — METRONIDAZOLE 500 MG: 250 TABLET ORAL at 17:22

## 2020-03-19 RX ADMIN — Medication 10 ML: at 21:28

## 2020-03-19 RX ADMIN — INSULIN LISPRO 4 UNITS: 100 INJECTION, SOLUTION INTRAVENOUS; SUBCUTANEOUS at 08:13

## 2020-03-19 RX ADMIN — Medication 10 ML: at 19:05

## 2020-03-19 RX ADMIN — INSULIN LISPRO 2 UNITS: 100 INJECTION, SOLUTION INTRAVENOUS; SUBCUTANEOUS at 08:14

## 2020-03-19 RX ADMIN — MORPHINE SULFATE 1 MG: 2 INJECTION, SOLUTION INTRAMUSCULAR; INTRAVENOUS at 12:35

## 2020-03-19 RX ADMIN — INSULIN LISPRO 2 UNITS: 100 INJECTION, SOLUTION INTRAVENOUS; SUBCUTANEOUS at 17:22

## 2020-03-19 RX ADMIN — ENOXAPARIN SODIUM 40 MG: 100 INJECTION SUBCUTANEOUS at 08:14

## 2020-03-19 RX ADMIN — IBUPROFEN 400 MG: 400 TABLET ORAL at 08:16

## 2020-03-19 RX ADMIN — INSULIN GLARGINE 19 UNITS: 100 INJECTION, SOLUTION SUBCUTANEOUS at 21:26

## 2020-03-19 RX ADMIN — ACETAMINOPHEN 1000 MG: 500 TABLET ORAL at 15:27

## 2020-03-19 RX ADMIN — Medication 10 ML: at 05:55

## 2020-03-19 RX ADMIN — MORPHINE SULFATE 1 MG: 2 INJECTION, SOLUTION INTRAMUSCULAR; INTRAVENOUS at 19:05

## 2020-03-19 RX ADMIN — OXYCODONE HYDROCHLORIDE 5 MG: 5 TABLET ORAL at 21:25

## 2020-03-19 RX ADMIN — DOCUSATE SODIUM 50 MG AND SENNOSIDES 8.6 MG 1 TABLET: 8.6; 5 TABLET, FILM COATED ORAL at 08:15

## 2020-03-19 RX ADMIN — ALPRAZOLAM 0.25 MG: 0.25 TABLET ORAL at 21:25

## 2020-03-19 RX ADMIN — AMOXICILLIN AND CLAVULANATE POTASSIUM 1 TABLET: 875; 125 TABLET, FILM COATED ORAL at 08:15

## 2020-03-19 RX ADMIN — INSULIN LISPRO 2 UNITS: 100 INJECTION, SOLUTION INTRAVENOUS; SUBCUTANEOUS at 12:34

## 2020-03-19 RX ADMIN — ALPRAZOLAM 0.25 MG: 0.25 TABLET ORAL at 08:15

## 2020-03-19 RX ADMIN — INSULIN LISPRO 4 UNITS: 100 INJECTION, SOLUTION INTRAVENOUS; SUBCUTANEOUS at 17:22

## 2020-03-19 RX ADMIN — METRONIDAZOLE 500 MG: 250 TABLET ORAL at 08:15

## 2020-03-19 RX ADMIN — PANTOPRAZOLE SODIUM 40 MG: 40 TABLET, DELAYED RELEASE ORAL at 05:54

## 2020-03-19 RX ADMIN — ACETAMINOPHEN 1000 MG: 500 TABLET ORAL at 21:25

## 2020-03-19 RX ADMIN — ACETAMINOPHEN 1000 MG: 500 TABLET ORAL at 08:16

## 2020-03-19 RX ADMIN — AMOXICILLIN AND CLAVULANATE POTASSIUM 1 TABLET: 875; 125 TABLET, FILM COATED ORAL at 21:25

## 2020-03-19 NOTE — PHYSICIAN ADVISORY
Letter of Status Determination: Current Status   INPATIENT is Appropriate         Pt Name:  Anh Black   MR#  [id number]   [facility]#   [id number]   [address] [place] [address]  215/01  @ [address]   Hospitalization date  3/18/2020 11:08 AM   Current Attending Physician  Gary Mcleod MD   Principal diagnosis  Perianal wound   Clinicals  Anh Black is a 44 y.o.  female with PMH of type 2 diabetes, marijuana abuse who initially presented to [facility] on 3/12/2020 with altered mental status likely due to [facility] abuse. Hospital course was complicated by Acute hypoxic respiratory failure likely d/t respiratory depression, uncontrolled diabetes w/ hyperglycemia due to medication non compliance, hypotension due to hypovolemia d/t osmotic diuresis from hyperglycemia, Dehydration d/t hyperglycemia which have since resolved. She has had perianal abscess s/p I&D at [address] [place] [address] in early march & discharged on Clindamycin. CT abd/pelvis on admission revealed perianal cellulitis without abscess. General surgery was consulted - did not recommend further surgical intervention. PAtient was treated with augmentin+metronidazole and wound care. She is homeless and unable to effectively perform wound care due to location of the wound.   Needs abx, uncontrolled DM -2    [place] MCG criteria   Does  NOT apply    STATUS DETERMINATION  On the basis of clinical data, available documentaion, we believe that the current status of this patient as INPATIENT is Appropriate     The final decision of the patient's hospitalization status depends on the attending physician's judgment    Additional comments     Insurance  Payor: Srivastava Shield / Plan: Curtistine Dines / Product Type: Managed Care Medicaid /    Insurance Information                [facility] [place]ICAID/[place] [address] Phone:     Subscriber: Mary Chacon Subscriber#: [id number]    Group#:  Precert#:                    Edger Rings MD  Cell: [phone]  Physician Advisor

## 2020-03-19 NOTE — PROGRESS NOTES
Hospitalist Progress Note    NAME: Lb Zhang   :  1980   MRN:  [id number]   Room Number:  405/67  @ [facility]       Interim Hospital Summary: 44 y.o. female whom presented on 3/18/2020 with      Assessment / Plan:  Perineal abscess due to diabetes and poor hygiene:  s/p I&D at [facility] :before DC on Clindamycin 450mg Q6hrs x 10 days on 3/11/20, likely noncompliant. Reviewed CT Abd/pelvis 3/12 -> shows perianal cellulitis, no abscess. No further surgical intervention recommended by Gen surgery at [facility].    - Continue Augmentin and metronidazole till 3/20/20  -Acetaminophen 1 g TID. Ibuprofen 400 mg TID every 6 hours as needed for mild-mod pain.   -Continue morphine 1 mg IV BID 15-30 minutes before dressing while wound packing is needed. - Wound care consult  - Sitz bath BID  - Glycemic management           Uncontrolled type 2 diabetes, without long term current use of insulin        Lab Results   Component Value Date/Time     Hemoglobin A1c 11.2 (H) 2020 06:39 AM   Seen by diabetes treatment center at [facility].    - Continue 19 units glargine daily, lispro 4 units TID AC, Lispro correctional scale AC HS.   - Consistent carb diet  - hypoglycemia protocol  - [facility] has recommended Metformin 1000 mg BID, consider adding amaryl.            Normochromic normocytic anemia   B12, folate reviewed : within normal limits  Iron panel within normal limits, low normal TIBC     - Outpatient follow up.                   Hx of Behcet's disease  Stable and not on any immunomodulating agent              Marijuana abuse  UDS +     - Patient was counseled extensively on the need to abstain from drugs its addictive tendencies, its deleterious effects on the brain, cardiovascular system, lungs as well as its financial & social sequelae        Body mass index is 35.97 kg/m².    Obesity, morbid  Counseled on Lifestyle modifications, AHA Diet ,weight loss strategies.     Code Status: full   Surrogate Decision Maker:  Name: Tiffany Quinonez Phone: [phone] Rel: Mother         DVT Prophylaxis: Lovenox  GI Prophylaxis: not indicated  Lines : none  Drains : none  Jarrell : none  Restraints : none  Wounds : bilateral buttocks, left cheek  Baseline: ambulatory independently       Subjective:     Chief Complaint / Reason for Physician Visit  \"still in pain\". Discussed with RN events overnight. Review of Systems:  No fevers, chills, appetite change, cough, sputum production, shortness of breath, dyspnea on exertion, nausea, vomitting, diarrhea, constipation, chest pain, leg edema, abdominal pain, joint pain, rash, itching. Tolerating diet. Objective:     VITALS:   Last 24hrs VS reviewed since prior progress note. Most recent are:  Patient Vitals for the past 24 hrs:   Temp Pulse Resp BP SpO2   03/19/20 0755 97.1 °F (36.2 °C) 67 18 96/62 99 %   03/18/20 2019 97.6 °F (36.4 °C) 73 18 103/59 98 %   03/18/20 1600 97.8 °F (36.6 °C) 76 18 110/63 98 %   03/18/20 1120 97.9 °F (36.6 °C) 73 18 98/69 99 %       Intake/Output Summary (Last 24 hours) at 3/19/2020 1010  Last data filed at 3/19/2020 0800  Gross per 24 hour   Intake 600 ml   Output --   Net 600 ml        PHYSICAL EXAM:  General: WD, WN. Alert, cooperative, no acute distress    EENT:  EOMI. Anicteric sclerae. MMM  Resp:  CTA bilaterally, no wheezing or rales. No accessory muscle use  CV:  Regular  rhythm,  normal S1/S2, no murmurs rubs gallops, No edema  GI:  Soft, Non distended, Non tender. +Bowel sounds  Neurologic:  Alert and oriented X 3, normal speech,   Psych:   Good insight. Not anxious nor agitated  Skin:  No rashes. No jaundice.  Dry clean dressing on buttocks    Reviewed most current lab test results and cultures  YES  Reviewed most current radiology test results   YES  Review and summation of old records today    NO  Reviewed patient's current orders and MAR    YES  PMH/SH reviewed - no change compared to H&P  ________________________________________________________________________  Care Plan discussed with:    Comments   Patient x    Family      RN x    Care Manager x    Consultant                        Multidiciplinary team rounds were held today with , nursing, pharmacist and clinical coordinator. Patient's plan of care was discussed; medications were reviewed and discharge planning was addressed. ________________________________________________________________________  Total NON critical care TIME:  25   Minutes    Total CRITICAL CARE TIME Spent:   Minutes non procedure based      Comments   >50% of visit spent in counseling and coordination of care     ________________________________________________________________________  Ranjan Romero MD     Procedures: see electronic medical records for all procedures/Xrays and details which were not copied into this note but were reviewed prior to creation of Plan. LABS:  I reviewed today's most current labs and imaging studies.   Pertinent labs include:  Recent Labs     03/19/20  0609 03/18/20  0428   WBC 5.8 10.0   HGB 10.3* 10.2*   HCT 33.7* 33.1*   * 452*     Recent Labs     03/19/20  0609 03/18/20  0428    137   K 4.2 4.0    104   CO2 27 30   * 111*   BUN 10 6   CREA 0.67 0.66   CA 8.9 8.8   MG 1.7 1.8   PHOS 4.3  --        Signed: Ranjan Romero MD

## 2020-03-19 NOTE — PROGRESS NOTES
Spiritual Care Assessment/Progress Note  [facility]      NAME: Lukas Hollis      MRN: [id number]  AGE: 44 y.o. SEX: female  Hindu Affiliation: Unknown   Language: English     3/19/2020     Total Time (in minutes): 5     Spiritual Assessment begun in [address] through conversation with:         [x]Patient        [] Family    [] Friend(s)        Reason for Consult: Initial/Spiritual assessment, patient floor     Spiritual beliefs: (Please include comment if needed)     [] Identifies with a michela tradition:         [] Supported by a michela community:            [] Claims no spiritual orientation:           [] Seeking spiritual identity:                [] Adheres to an individual form of spirituality:           [x] Not able to assess:                           Identified resources for coping:      [] Prayer                               [] Music                  [] Guided Imagery     [] Family/friends                 [] Pet visits     [] Devotional reading                         [x] Unknown     [] Other:                                              Interventions offered during this visit: (See comments for more details)    Patient Interventions: Other (comment)(Declined visit at this time)           Plan of Care:     [] Support spiritual and/or cultural needs    [] Support AMD and/or advance care planning process      [] Support grieving process   [] Coordinate Rites and/or Rituals    [] Coordination with community clergy   [] No spiritual needs identified at this time   [] Detailed Plan of Care below (See Comments)  [] Make referral to Music Therapy  [] Make referral to Pet Therapy     [] Make referral to Addiction services  [] Make referral to [facility]  [] Make referral to Spiritual Care Partner  [] No future visits requested        [x] Follow up visits as needed     Comments: The purpose of the visit was to conduct a spiritual assessment on the patient.   The patient was not being visited by anyone, however she appeared to be resting and did not welcome a visit. The  provided a presence of ministry a left the patient to rest.    [address] [facility] Jared M.Div.    paging service [phone])

## 2020-03-19 NOTE — PROGRESS NOTES
Problem: Self Care Deficits Care Plan (Adult)  Goal: *Acute Goals and Plan of Care (Insert Text)  Description:   FUNCTIONAL STATUS PRIOR TO ADMISSION: Patient was independent without use of DME.     HOME SUPPORT: patient was at [facility] and discharged to [facility]    Occupational Therapy Goals  Initiated 3/19/2020  1. Patient will perform grooming with independence standing at the sink within 7 day(s). 2.  Patient will perform lower body dressing with independence within 7 day(s). 3.  Patient will perform bathing with modified independence within 7 day(s). 4.  Patient will perform toilet transfers with independence within 7 day(s). 5.  Patient will perform all aspects of toileting with independence within 7 day(s). 6.  Patient will demonstrate good dynamic balance during ADL mobility within 7 days(s). OCCUPATIONAL THERAPY EVALUATION  Patient: Anna Morales (89 y.o. female)  Date: 3/19/2020  Primary Diagnosis: Perianal abscess [K61.0]        Precautions:   Fall    ASSESSMENT  Based on the objective data described below, the patient presents with unsteady gait, seems to stagger however also appeared somewhat behavioral as she was frustrated at need to get up. Noted decreased coordination bilateral hands, left worse than right however patient denied changes in coordination/sensation. Will continue to follow in effort to increase safety. Current Level of Function Impacting Discharge (ADLs/self-care): CGA to independent basic ADLs    Functional Outcome Measure: The patient scored 70/100 on the Barthel Index outcome measure   Other factors to consider for discharge: homeless     Patient will benefit from skilled therapy intervention to address the above noted impairments.        PLAN :  Recommendations and Planned Interventions: self care training, functional mobility training, therapeutic exercise, balance training, therapeutic activities, endurance activities, patient education, home safety training, and family training/education    Frequency/Duration: Patient will be followed by occupational therapy PRN to address goals. Recommendation for discharge: (in order for the patient to meet his/her long term goals)  No skilled occupational therapy/ follow up rehabilitation needs identified at this time. This discharge recommendation:  Has been made in collaboration with the attending provider and/or case management    IF patient discharges home will need the following DME: none       SUBJECTIVE:   Patient stated I'm going to do that later.  re: bathing    OBJECTIVE DATA SUMMARY:   HISTORY:   Past Medical History:   Diagnosis Date    Diabetes ([facility] [address]    No past surgical history on file. Expanded or extensive additional review of patient history:     Home Situation  Home Environment: Shelter  One/Two Story Residence: Other (Comment)  Living Alone: No  Support Systems: Shelter, Child(hayden)  Patient Expects to be Discharged to[de-identified] Shelter  Current DME Used/Available at Home: None    Hand dominance: unknown    EXAMINATION OF PERFORMANCE DEFICITS:  Cognitive/Behavioral Status:  Neurologic State: Alert  Orientation Level: Oriented X4  Cognition: Follows commands  Perception: Appears intact  Perseveration: No perseveration noted  Safety/Judgement: Awareness of environment; Fall prevention    Skin: wound care following for abscess    Edema: none noted    Hearing: Auditory  Auditory Impairment: None    Vision/Perceptual:                           Acuity: (legally blind right eye)         Range of Motion:  AROM: Within functional limits                         Strength:  Strength: Generally decreased, functional      Coordination:  Coordination: Generally decreased, functional       Gross Motor Skills-Upper: Left Impaired;Right Impaired( noted when putting on socks and pillowcase on pillow)    Tone & Sensation:      Tone: normal  Sensation: intact per patient report        Balance:  Sitting: Intact; Without support  Standing: Impaired  Standing - Static: Good  Standing - Dynamic : Fair    Functional Mobility and Transfers for ADLs:  Bed Mobility:  Rolling: Independent  Supine to Sit: Independent  Sit to Supine: Independent  Scooting: Independent    Transfers:  Sit to Stand: Supervision  Stand to Sit: Supervision  Bed to Chair: Supervision  Bathroom Mobility: Supervision/set up  Toilet Transfer : Supervision;Modified independent(safety concerns)    ADL Assessment:       Oral Facial Hygiene/Grooming: Supervision    Bathing: Contact guard assistance    Upper Body Dressing: Supervision(cues for problem solving)    Lower Body Dressing: Supervision    Toileting: Stand by assistance        ADL Intervention and task modifications:       Cognitive Retraining  Safety/Judgement: Awareness of environment; Fall prevention    Functional Measure:  Barthel Index:    Bathin  Bladder: 10  Bowels: 10  Groomin  Dressin  Feeding: 10  Mobility: 10  Stairs: 5  Toilet Use: 5  Transfer (Bed to Chair and Back): 10  Total: 70/100        The Barthel ADL Index: Guidelines  1. The index should be used as a record of what a patient does, not as a record of what a patient could do. 2. The main aim is to establish degree of independence from any help, physical or verbal, however minor and for whatever reason. 3. The need for supervision renders the patient not independent. 4. A patient's performance should be established using the best available evidence. Asking the patient, friends/relatives and nurses are the usual sources, but direct observation and common sense are also important. However direct testing is not needed. 5. Usually the patient's performance over the preceding 24-48 hours is important, but occasionally longer periods will be relevant. 6. Middle categories imply that the patient supplies over 50 per cent of the effort. 7. Use of aids to be independent is allowed. Leonora Vasquez., Barthel, D.W. (0029).  Functional evaluation: the Barthel Index. [facility] (14)2. [place] JOSE Barclay, Smiley Proctor., [facility] Abhinav.[facility], [address] (1999). Measuring the change indisability after inpatient rehabilitation; comparison of the responsiveness of the Barthel Index and Functional [place] Measure. Journal of Neurology, Neurosurgery, and Psychiatry, 66(4), 589-087. HANK Wallace, EMORY Benitez, & Katty iTneo M.A. (2004.) Assessment of post-stroke quality of life in cost-effectiveness studies: The usefulness of the Barthel Index and the EuroQoL-5D. Quality of Life Research, 15, 157-11         Occupational Therapy Evaluation Charge Determination   History Examination Decision-Making   LOW Complexity : Brief history review  LOW Complexity : 1-3 performance deficits relating to physical, cognitive , or psychosocial skils that result in activity limitations and / or participation restrictions  MEDIUM Complexity : Patient may present with comorbidities that affect occupational performnce. Miniml to moderate modification of tasks or assistance (eg, physical or verbal ) with assesment(s) is necessary to enable patient to complete evaluation       Based on the above components, the patient evaluation is determined to be of the following complexity level: LOW   Pain Rating:  No complaint    Activity Tolerance:   Poor due to motivation/behavior  Please refer to the flowsheet for vital signs taken during this treatment. After treatment patient left in no apparent distress:    Supine in bed and Call bell within reach    COMMUNICATION/EDUCATION:   The patients plan of care was discussed with: Physical therapist, Registered nurse, and Physician. Home safety education was provided and the patient/caregiver indicated understanding. and Patient understands intent and goals of therapy, but is neutral about his/her participation. This patients plan of care is appropriate for delegation to [facility].     Thank you for this referral.  Dallin Paiz, OTR/L  Time Calculation: 16 mins

## 2020-03-19 NOTE — PROGRESS NOTES
Problem: Diabetes Self-Management  Goal: *Disease process and treatment process  Description: Define diabetes and identify own type of diabetes; list 3 options for treating diabetes. Outcome: Progressing Towards Goal  Goal: *Using medications safely  Description: State effect of diabetes medications on diabetes; name diabetes medication taking, action and side effects. Outcome: Progressing Towards Goal  Goal: *Monitoring blood glucose, interpreting and using results  Description: Identify recommended blood glucose targets  and personal targets. Outcome: Progressing Towards Goal     Problem: Falls - Risk of  Goal: *Absence of Falls  Description: Document Chivo Ramsey Fall Risk and appropriate interventions in the flowsheet.   Outcome: Progressing Towards Goal  Note: Fall Risk Interventions:  Mobility Interventions: Patient to call before getting OOB    Mentation Interventions: Adequate sleep, hydration, pain control    Medication Interventions: Patient to call before getting OOB    Elimination Interventions: Call light in reach

## 2020-03-19 NOTE — PROGRESS NOTES
Wound care consult from [address] [place] [address]. Chart reviewed and assessed the patient. This is a 44year old female patient transferred from [facility] for the continuation of care.      Assessment done of  Her right and left buttock abscess sites that were I&D done at [facility] recently. Eva Hay was discharged from [address] [place] [address] on 3.11.2020.     Chart reviewed. Admitted DX:  Hypoxia; Perianal cellulitis; Hyperglycemia; Weakness  Past Medical History:  DM; legally blind in Right eye; homeless     Assessment:   Patient is A&O x 3, communicative and mobile independently in bed. Bed: [facility]  Patient says she wears briefs for incontinence occasionaly. Personnel hygeine is poor  Patient reports pain to the buttocks earlier and gave pain medicine by the RN. Heels intact without erythema. Sacrum intact without erythema.     1. POA Left buttock, s/p I&D abscess site: 0.5 x 1 cm x 0.4 cm;  Unable to see depth of wound yeasterday; there is yellow slough coming out of wound but looks clean today no drainage;  no odor; No induration and red tenderness to the periwound. 2.  POA Right buttock, s/p I&D abscess site:  0.5 x 0.9 x 0. 3 cm; unable to see depth of wound yesterday; the area that is visible is pink; no drainage or no odor; no induration and red tenderness to the periwound.     Wound, Pressure Prevention & Skin Care Recommendations:    1.  Please use one layer of linen/pads under patient to optimize support surface.    2.   Encourage the patient  To keep the perineal area clean. 3.   Right and left buttocks:  Clean the s/p I D abscess area with NS and apply small amount of Carrasyn gel and cover it with foam dressing. The wound is almost healed. Please use whatever available in the room to do the dressing. No need for any packing now.   Debbie Ruby present in the room. Discussed the wound condition with DR Sohail Lind and CHEMA/Michelet Montgomery with patient and Luz Maria Kenney RN.

## 2020-03-19 NOTE — CDMP QUERY
Pt admitted with Perineal abscess. Pt noted to have Hx uncontrolled diabetes w/ hyperglycemia due to medication non compliance. If possible, please document in progress notes and discharge summary the relationship, if any, between cellulitis and DM.   Perineal abscess due to Diabetes  Perineal abscess unrelated to Diabetes  Other  Clinically unable to determine The medical record reflects the following: 
  Risk Factors: Hx uncontrolled diabetes w/ hyperglycemia due to medication non compliance Clinical Indicators: Gluc: 111 ^ 118. .... Jason Huerta Hgb A1c: 11.2 (3/12). ...... CT abd/pelv: Perineal cellulitis without evidence of abscess. ...... Jason Huerta Noted: She has had perianal abscess s/p I&D at [facility] in early march & discharged on Clindamycin. CT abd/pelvis on admission revealed perianal cellulitis without abscess. General surgery was consulted - did not recommend further surgical intervention. PAtient was treated with augmentin+metronidazole and wound care Treatment: Wound Care consult; Continue Augmentin and metronidazole till 3/20/20; Glycemic management; Sitz bath BID; Pain control Thank you, Pastora Bo [address] [place] [address] 
[phone]

## 2020-03-19 NOTE — PROGRESS NOTES
0700 - Bedside and Verbal shift change report given to carole villa (oncoming nurse) by [employer] hali (offgoing nurse). Report included the following information SBAR, Kardex, Intake/Output, MAR and Recent Results. 200 - RN was informed by monitor tech that patient attempted to elope. Patient escorted back to bed. Charge RN informed. Nursing Supervisor informed. Will move patient closer to nursing station after shift change. 1915 - Bedside and Verbal shift change report given to Martha Burch (oncoming nurse) by Jesi Patel (offgoing nurse). Report included the following information SBAR, Kardex, Intake/Output, MAR and Recent Results.

## 2020-03-20 VITALS
RESPIRATION RATE: 16 BRPM | DIASTOLIC BLOOD PRESSURE: 84 MMHG | TEMPERATURE: 98 F | SYSTOLIC BLOOD PRESSURE: 111 MMHG | OXYGEN SATURATION: 100 % | HEART RATE: 65 BPM | HEIGHT: 66 IN | WEIGHT: 211.2 LBS | BODY MASS INDEX: 33.94 KG/M2

## 2020-03-20 LAB
GLUCOSE BLD STRIP.AUTO-MCNC: 121 MG/DL (ref 65–100)
GLUCOSE BLD STRIP.AUTO-MCNC: 160 MG/DL (ref 65–100)
SERVICE CMNT-IMP: ABNORMAL
SERVICE CMNT-IMP: ABNORMAL

## 2020-03-20 PROCEDURE — 82962 GLUCOSE BLOOD TEST: CPT

## 2020-03-20 PROCEDURE — 74011250637 HC RX REV CODE- 250/637: Performed by: STUDENT IN AN ORGANIZED HEALTH CARE EDUCATION/TRAINING PROGRAM

## 2020-03-20 PROCEDURE — 74011250636 HC RX REV CODE- 250/636: Performed by: STUDENT IN AN ORGANIZED HEALTH CARE EDUCATION/TRAINING PROGRAM

## 2020-03-20 PROCEDURE — 74011636637 HC RX REV CODE- 636/637: Performed by: STUDENT IN AN ORGANIZED HEALTH CARE EDUCATION/TRAINING PROGRAM

## 2020-03-20 RX ORDER — PANTOPRAZOLE SODIUM 40 MG/1
40 TABLET, DELAYED RELEASE ORAL
Qty: 30 TAB | Refills: 0 | OUTPATIENT
Start: 2020-03-21 | End: 2021-06-15

## 2020-03-20 RX ORDER — AMOXICILLIN 250 MG
1 CAPSULE ORAL
Qty: 30 TAB | Refills: 0 | OUTPATIENT
Start: 2020-03-20 | End: 2021-06-15

## 2020-03-20 RX ORDER — SITAGLIPTIN AND METFORMIN HYDROCHLORIDE 50; 1000 MG/1; MG/1
1 TABLET, FILM COATED ORAL 2 TIMES DAILY WITH MEALS
Status: ON HOLD | COMMUNITY
End: 2020-03-20 | Stop reason: SDUPTHER

## 2020-03-20 RX ORDER — AMOXICILLIN AND CLAVULANATE POTASSIUM 875; 125 MG/1; MG/1
1 TABLET, FILM COATED ORAL EVERY 12 HOURS
Qty: 2 TAB | Refills: 0 | Status: SHIPPED | OUTPATIENT
Start: 2020-03-20 | End: 2020-05-06 | Stop reason: ALTCHOICE

## 2020-03-20 RX ORDER — METRONIDAZOLE 500 MG/1
500 TABLET ORAL 2 TIMES DAILY
Qty: 2 TAB | Refills: 0 | Status: SHIPPED | OUTPATIENT
Start: 2020-03-20 | End: 2020-05-06 | Stop reason: ALTCHOICE

## 2020-03-20 RX ORDER — IBUPROFEN 200 MG
1 TABLET ORAL EVERY 24 HOURS
Qty: 30 PATCH | Refills: 0 | Status: SHIPPED | OUTPATIENT
Start: 2020-03-21 | End: 2020-04-20

## 2020-03-20 RX ORDER — SITAGLIPTIN AND METFORMIN HYDROCHLORIDE 50; 1000 MG/1; MG/1
1 TABLET, FILM COATED ORAL 2 TIMES DAILY WITH MEALS
Qty: 60 TAB | Refills: 0 | Status: SHIPPED | OUTPATIENT
Start: 2020-03-20 | End: 2020-05-06 | Stop reason: ALTCHOICE

## 2020-03-20 RX ORDER — ACETAMINOPHEN 500 MG
500 TABLET ORAL
Qty: 30 TAB | Refills: 0 | OUTPATIENT
Start: 2020-03-20 | End: 2021-06-15

## 2020-03-20 RX ADMIN — PANTOPRAZOLE SODIUM 40 MG: 40 TABLET, DELAYED RELEASE ORAL at 06:58

## 2020-03-20 RX ADMIN — AMOXICILLIN AND CLAVULANATE POTASSIUM 1 TABLET: 875; 125 TABLET, FILM COATED ORAL at 08:05

## 2020-03-20 RX ADMIN — INSULIN LISPRO 4 UNITS: 100 INJECTION, SOLUTION INTRAVENOUS; SUBCUTANEOUS at 12:41

## 2020-03-20 RX ADMIN — METRONIDAZOLE 500 MG: 250 TABLET ORAL at 08:05

## 2020-03-20 RX ADMIN — ALPRAZOLAM 0.25 MG: 0.25 TABLET ORAL at 08:05

## 2020-03-20 RX ADMIN — MORPHINE SULFATE 1 MG: 2 INJECTION, SOLUTION INTRAMUSCULAR; INTRAVENOUS at 08:04

## 2020-03-20 RX ADMIN — ENOXAPARIN SODIUM 40 MG: 100 INJECTION SUBCUTANEOUS at 08:06

## 2020-03-20 RX ADMIN — Medication 10 ML: at 06:55

## 2020-03-20 RX ADMIN — ACETAMINOPHEN 1000 MG: 500 TABLET ORAL at 08:05

## 2020-03-20 RX ADMIN — DOCUSATE SODIUM 50 MG AND SENNOSIDES 8.6 MG 1 TABLET: 8.6; 5 TABLET, FILM COATED ORAL at 08:05

## 2020-03-20 RX ADMIN — INSULIN LISPRO 4 UNITS: 100 INJECTION, SOLUTION INTRAVENOUS; SUBCUTANEOUS at 08:31

## 2020-03-20 RX ADMIN — INSULIN LISPRO 2 UNITS: 100 INJECTION, SOLUTION INTRAVENOUS; SUBCUTANEOUS at 12:41

## 2020-03-20 NOTE — DISCHARGE SUMMARY
Hospitalist Discharge Summary     Patient ID:  Shannen Mitchell  [id number]  1 y.o.  1980    PCP on record: None    Admit date: 3/18/2020  Discharge date and time: 3/20/2020      Admission Diagnoses: Perianal abscess [K61.0]    Discharge Diagnoses: Active Problems:    Perianal abscess (3/19/2020)           Hospital Course:   Perineal abscess due to diabetes and poor hygiene:  s/p I&D at [address] [place] [address] Avenue on Clindamycin 450mg Q6hrs x 10 days on 3/11/20, likely noncompliant. Reviewed CT Abd/pelvis 3/12 -> shows perianal cellulitis, no abscess. No further surgical intervention recommended by Gen surgery at [facility]. Continue Augmentin and metronidazole till 3/20/20  Acetaminophen 1 g TID. Ibuprofen 400 mg TID every 6 hours as needed for mild-mod pain. Discontinued opiates at discharge, Advised hygiene routine wound care and [facility]           Uncontrolled type 2 diabetes, without long term current use of insulin            Lab Results   Component Value Date/Time     Hemoglobin A1c 11.2 (H) 03/12/2020 06:39 AM   Seen by diabetes treatment center at [facility].    - Continue 19 units glargine daily, lispro 4 units TID AC, Lispro correctional scale AC HS.    Patient should ideally be discharged on insulin but is very non compliant.   - DTC has recommended Metformin 1000 mg BID, consider adding amaryl.   Discharged on Janumet.         Normochromic normocytic anemia   B12, folate reviewed : within normal limits  Iron panel within normal limits, low normal TIBC      - Outpatient follow up.                   Hx of Behcet's disease  Stable and not on any immunomodulating agent              Marijuana abuse  UDS +     - Patient was counseled extensively on the need to abstain from drugs its addictive tendencies, its deleterious effects on the brain, cardiovascular system, lungs as well as its financial & social sequelae        Body mass index is 35.97 kg/m².   Obesity, morbid  Counseled on Lifestyle modifications, AHA Diet ,weight loss strategies. CONSULTATIONS:  None    Excerpted HPI from H&P of Campbell Cowden, MD:  44 y.o.  female with PMH of type 2 diabetes, marijuana abuse who initially presented to [facility] on 3/12/2020 with altered mental status likely due to [facility] abuse. Hospital course was complicated by Acute hypoxic respiratory failure likely d/t respiratory depression, uncontrolled diabetes w/ hyperglycemia due to medication non compliance, hypotension due to hypovolemia d/t osmotic diuresis from hyperglycemia, Dehydration d/t hyperglycemia which have since resolved. She has had perianal abscess s/p I&D at [facility] in early march & discharged on Clindamycin. CT abd/pelvis on admission revealed perianal cellulitis without abscess. General surgery was consulted - did not recommend further surgical intervention. PAtient was treated with augmentin+metronidazole and wound care. She is homeless and unable to effectively perform wound care due to location of the wound. She has been transferred to [facility] for wound care and antibiotics until outpatient wound care can be arranged    ______________________________________________________________________  DISCHARGE SUMMARY/HOSPITAL COURSE:  for full details see H&P, daily progress notes, labs, consult notes. Visit Vitals  /84   Pulse 65   Temp 98 °F (36.7 °C)   Resp 16   Ht 5' 6\" (1.676 m)   Wt 95.8 kg (211 lb 3.2 oz)   SpO2 100%   Breastfeeding No   BMI 34.09 kg/m²       Patient seen and examined by me on discharge day. Pertinent Findings:  Gen:    Not in distress  Chest: Clear lungs  CVS:   Regular rhythm. No edema  Abd:  Soft, not distended, not tender  Neuro:  Alert with good insight. Oriented to person, place, and time   Buttock wound examined on dc date :  S/p I&d bilaterally. 2 small wounds with no drainage, odor, induration.  Minor erythema and mild tenderness of periwound skin ______________________________________________________________________  DISCHARGE MEDICATIONS:   Discharge Medication List as of 3/20/2020  1:30 PM      START taking these medications    Details   senna-docusate (PERICOLACE) 8.6-50 mg per tablet Take 1 Tab by mouth daily as needed for Constipation. , Normal, Disp-30 Tab, R-0      metroNIDAZOLE (FLAGYL) 500 mg tablet Take 1 Tab by mouth two (2) times a day., Normal, Disp-2 Tab, R-0      acetaminophen (TYLENOL) 500 mg tablet Take 1 Tab by mouth every six (6) hours as needed for Pain., Normal, Disp-30 Tab, R-0      amoxicillin-clavulanate (AUGMENTIN) 875-125 mg per tablet Take 1 Tab by mouth every twelve (12) hours. , Normal, Disp-2 Tab, R-0      nicotine (NICODERM CQ) 21 mg/24 hr 1 Patch by TransDERmal route every twenty-four (24) hours for 30 days. , Normal, Disp-30 Patch, R-0      pantoprazole (PROTONIX) 40 mg tablet Take 1 Tab by mouth Daily (before breakfast). , Normal, Disp-30 Tab, R-0         CONTINUE these medications which have CHANGED    Details   SITagliptin-metFORMIN (Janumet) 50-1,000 mg per tablet Take 1 Tab by mouth two (2) times daily (with meals). , Normal, Disp-60 Tab, R-0         CONTINUE these medications which have NOT CHANGED    Details   ALPRAZolam (XANAX) 0.5 mg tablet Take 1 Tab by mouth three (3) times daily as needed for Anxiety.  Max Daily Amount: 1.5 mg., Print, Disp-60 Tab, R-1         STOP taking these medications       citalopram (CELEXA) 20 mg tablet Comments:   Reason for Stopping:         ergocalciferol (ERGOCALCIFEROL) 1,250 mcg (50,000 unit) capsule Comments:   Reason for Stopping:         metoprolol succinate (TOPROL-XL) 50 mg XL tablet Comments:   Reason for Stopping:         sacubitriL-valsartan (Entresto) 24-26 mg tablet Comments:   Reason for Stopping:               My Recommended Diet, Activity, Wound Care, and follow-up labs are listed in the patient's Discharge Insturctions which I have personally completed and reviewed. _______________________________________________________________________  DISPOSITION:     Home with Family: x   Home with HH/PT/OT/RN:    SNF/LTC:    LEIF:    OTHER:        Condition at Discharge:  Stable  _______________________________________________________________________  Follow up with:   PCP : None  Follow-up Information     Follow up With Specialties Details [address] [facility]  Please call to make an appointment.  RA[place] [address]  [phone]    [facility]   Please follow-up with your  Mr. Klever Alvarez   Address: [address] [place] [address] Avenue  Phone: [phone]                Total time in minutes spent coordinating this discharge (includes going over instructions, follow-up, prescriptions, and preparing report for sign off to her PCP) : 25 minutes    Signed:  Angela Haddad MD

## 2020-03-20 NOTE — DISCHARGE INSTRUCTIONS
Patient Education        Perineal Abscess: Care Instructions  Your Care Instructions  A perineal abscess is an infection that causes a painful lump in the perineum. The perineum is the area between the scrotum and the anus in a man. In a woman, it's the area between the vulva and the anus. The area may look red and feel painful and be swollen. The abscess may form after surgery or after delivery of a baby. It can also be caused by an infection of the prostate gland. The prostate gland is an organ found inside a man's body, just below the bladder. Your doctor may have done minor surgery to open and drain the abscess. You may have had a sedative to help you relax. You may be unsteady after having sedation. It can take a few hours for the medicine's effects to wear off. Common side effects include nausea, vomiting, and feeling sleepy or tired. The doctor has checked you carefully, but problems can develop later. If you notice any problems or new symptoms, get medical treatment right away. Follow-up care is a key part of your treatment and safety. Be sure to make and go to all appointments, and call your doctor if you are having problems. It's also a good idea to know your test results and keep a list of the medicines you take. How can you care for yourself at home? · If your doctor gave you a sedative:  ? For 24 hours, don't do anything that requires attention to detail. This includes going to work, making important decisions, or signing any legal documents. It takes time for the medicine's effects to completely wear off.  ? For your safety, do not drive or operate any machinery that could be dangerous. Wait until the medicine wears off. You need to be able to think clearly and react easily. · Be safe with medicines. Read and follow all instructions on the label. ? If the doctor gave you a prescription medicine for pain, take it as prescribed.   ? If you are not taking a prescription pain medicine, ask your doctor if you can take an over-the-counter medicine. · If your doctor prescribed antibiotics, take them as directed. Do not stop taking them just because you feel better. You need to take the full course of antibiotics. · Sit in a few inches of warm water (sitz bath) 3 times a day and after bowel movements. The warm water helps the area heal and eases discomfort. When should you call for help? Call 911 anytime you think you may need emergency care. For example, call if:    · You have trouble breathing.     · You passed out (lost consciousness).    Call your doctor now or seek immediate medical care if:    · You have symptoms of infection, such as:  ? Increased pain, swelling, warmth, or redness. ? Red streaks leading from the area. ? Pus draining from the area. ? A fever.    Watch closely for changes in your health, and be sure to contact your doctor if:    · You do not get better as expected. Where can you learn more? Go to http://brionna-sigrid.info/  Enter J195 in the search box to learn more about \"Perineal Abscess: Care Instructions. \"  Current as of: August 11, 2019Content Version: 12.4  © 6054-4605 [employer]. Care instructions adapted under license by [employer] (which disclaims liability or warranty for this information). If you have questions about a medical condition or this instruction, always ask your healthcare professional. [place] [address] any warranty or liability for your use of this information.

## 2020-03-20 NOTE — PROGRESS NOTES
0700) Bedside shift change report given to Troy Piña RN (oncoming nurse) by Alexander Aguero RN (offgoing nurse). Report included the following information SBAR, Kardex, MAR, Accordion and Recent Results. 0900) pt allow wound care. Small area on R buttocks- 0.5 x 0.1x0.1 cm and on the L buttocks 1x0.1x0.1 cm. Cleaned with normal saline. Covered with gauze and transparent. 1211) Dr. Kaleb Tellez at bedside. Plan to discharge  [phone]) Discuss with Raymond Blood, care managers, plans for discharge  [phone] . Ramone Coley Reviewed discharge instructions with pt including numbers to call for follow-up appointment, new medications and side effects, medications to continue, abscess education, and MyChart information. Pt expressed understanding. IV was removed. Belongings sent home with pt. Home medication returned to pt. Some wound care supplies given to pt; pt refuse some of the supplies.    6235) pt wheeled downstairs for discharge

## 2020-03-20 NOTE — PROGRESS NOTES
Reason for Admission:   44year old female transferred from hospital for continued wound care and discharge planning. RUR Score:    8                 Plan for utilizing home health:    No indication for home health at this time. Wound is healing patient care provide self care to area. PCP:  NO PCP at this time. Patient is in agreement with CM setting up appointment for follow-up. First and Last name:     Name of Practice:    Are you a current patient: Yes/No:    Approximate date of last visit:                     Current Advanced Directive/Advance Care Plan: No written plan at this time. Patient does not want to complete. Decision maker would be her mother, Otis Grandchild ([phone])                         Transition of Care Plan:                    Met with patient for ongoing assessment. Patient stated to CM and attending that she would like to be discharged today. After clinical assessment, attending feels that she will be safe for d/c. Patient states that she would like transportation to [facility] at [employer] and stated that her  is \"Mr. Caruso\". Patient has Medicaid insurance and states that she is able to pay for medications if needed. Discharge Planning-  1. Arrange PCP appointment  2. Arrange transportation to [facility]  3. Patient has requested that medications be sent to [facility] on [address] Dr. Maryjane Jordan is aware. Care Management Interventions  PCP Verified by CM: Yes  Mode of Transport at Discharge: Other (see comment)(Round Trip)  Transition of Care Consult (CM Consult): Discharge Planning  Current Support Network: Lives Alone  The Plan for Transition of Care is Related to the Following Treatment Goals :  Follow-up with PCP, Community Resources  The Patient and/or Patient Representative was Provided with a Choice of Provider and Agrees with the Discharge Plan?: Yes  Name of the Patient Representative Who was Provided with a Choice of Provider and Agrees with the Discharge Plan: Patient  Freedom of Choice List was Provided with Basic Dialogue that Supports the Patient's Individualized Plan of Care/Goals, Treatment Preferences and Shares the Quality Data Associated with the Providers?: Yes  Discharge Location  Discharge Placement: [address] [place] [address]  [phone]

## 2020-03-21 ENCOUNTER — PATIENT OUTREACH (OUTPATIENT)
Dept: FAMILY MEDICINE CLINIC | Age: 40
End: 2020-03-21

## 2020-03-21 NOTE — PROGRESS NOTES
3/21/20 Unable reach patient at any number listed -phone numbers are not in service. Episode will be closed at this time. [facility]  3/21/20 Unable to reach patient at listed number at this time. [facility]

## 2020-03-25 ENCOUNTER — TELEPHONE (OUTPATIENT)
Dept: CASE MANAGEMENT | Age: 40
End: 2020-03-25

## 2020-04-06 ENCOUNTER — TELEPHONE (OUTPATIENT)
Dept: CASE MANAGEMENT | Age: 40
End: 2020-04-06

## 2020-04-22 ENCOUNTER — TELEPHONE (OUTPATIENT)
Dept: CASE MANAGEMENT | Age: 40
End: 2020-04-22

## 2020-05-06 ENCOUNTER — VIRTUAL VISIT (OUTPATIENT)
Dept: PRIMARY CARE CLINIC | Age: 40
End: 2020-05-06

## 2020-05-06 DIAGNOSIS — F41.9 ANXIETY AND DEPRESSION: ICD-10-CM

## 2020-05-06 DIAGNOSIS — E11.9 CONTROLLED TYPE 2 DIABETES MELLITUS WITHOUT COMPLICATION, WITH LONG-TERM CURRENT USE OF INSULIN (HCC): Primary | ICD-10-CM

## 2020-05-06 DIAGNOSIS — M35.2 BEHCET'S DISEASE (HCC): ICD-10-CM

## 2020-05-06 DIAGNOSIS — F32.A ANXIETY AND DEPRESSION: ICD-10-CM

## 2020-05-06 DIAGNOSIS — Z79.4 CONTROLLED TYPE 2 DIABETES MELLITUS WITHOUT COMPLICATION, WITH LONG-TERM CURRENT USE OF INSULIN (HCC): Primary | ICD-10-CM

## 2020-05-06 PROBLEM — K61.0 PERIANAL ABSCESS: Status: RESOLVED | Noted: 2020-03-19 | Resolved: 2020-05-06

## 2020-05-06 PROBLEM — R53.1 WEAKNESS: Status: RESOLVED | Noted: 2020-03-12 | Resolved: 2020-05-06

## 2020-05-06 RX ORDER — QUETIAPINE FUMARATE 50 MG/1
50 TABLET, FILM COATED ORAL 2 TIMES DAILY
Qty: 60 TAB | Refills: 0 | Status: SHIPPED | OUTPATIENT
Start: 2020-05-06 | End: 2020-06-05

## 2020-05-06 RX ORDER — METFORMIN HYDROCHLORIDE 500 MG/1
500 TABLET ORAL 2 TIMES DAILY WITH MEALS
Qty: 60 TAB | Refills: 0 | Status: SHIPPED | OUTPATIENT
Start: 2020-05-06 | End: 2020-06-05

## 2020-05-06 NOTE — PROGRESS NOTES
Written by Liila Jennings, as dictated by Dr. Ian Dunne MD.    Iam Hamilton is a 44 y.o. female. HPI  I was in the office while conducting this encounter. Consent:  She and/or her healthcare decision maker is aware that this patient-initiated Telehealth encounter is a billable service, with coverage as determined by her insurance carrier. She is aware that she may receive a bill and has provided verbal consent to proceed: Yes    This virtual visit was conducted via [employer]. Pursuant to the emergency declaration under the [facility], [facility] waiver authority and the [employer] Act, this Virtual  Visit was conducted to reduce the patient's risk of exposure to COVID-19 and provide continuity of care for an established patient. Services were provided through a video synchronous discussion virtually to substitute for in-person clinic visit. Due to this being a TeleHealth evaluation, many elements of the physical examination are unable to be assessed. The patient presents to establish care. She moved to [facility] from [place] last year and has not established care with a PCP since moving to [facility]. She has a hx of depression and anxiety and has been feeling more depressed lately. She cannot attribute her depressive feelings to anything in particular, but states that she does not feel well. She has been hospitalized previously for depression. She would like to be referred to a psychiatrist.     She has a hx of diabetes. She does not have her medications at home and was previously on 70/30 insulin and metformin. She has a hx of behcet's disease.      Patient Active Problem List   Diagnosis Code    Perianal cellulitis K61.0    Hyperglycemia R73.9    Behcet's disease ([facility] [address] M35.2        Current Outpatient Medications on File Prior to Visit   Medication Sig Dispense Refill    senna-docusate (PERICOLACE) 8.6-50 mg per tablet Take 1 Tab by mouth daily as needed for Constipation. 30 Tab 0    acetaminophen (TYLENOL) 500 mg tablet Take 1 Tab by mouth every six (6) hours as needed for Pain. 30 Tab 0    pantoprazole (PROTONIX) 40 mg tablet Take 1 Tab by mouth Daily (before breakfast). 30 Tab 0    [DISCONTINUED] metroNIDAZOLE (FLAGYL) 500 mg tablet Take 1 Tab by mouth two (2) times a day. 2 Tab 0    [DISCONTINUED] SITagliptin-metFORMIN (Janumet) 50-1,000 mg per tablet Take 1 Tab by mouth two (2) times daily (with meals). 60 Tab 0    [DISCONTINUED] amoxicillin-clavulanate (AUGMENTIN) 875-125 mg per tablet Take 1 Tab by mouth every twelve (12) hours. 2 Tab 0    ALPRAZolam (XANAX) 0.5 mg tablet Take 1 Tab by mouth three (3) times daily as needed for Anxiety. Max Daily Amount: 1.5 mg. 60 Tab 1     No current facility-administered medications on file prior to visit. No Known Allergies    Past Medical History:   Diagnosis Date    Diabetes ([facility] [address]        No past surgical history on file. No family history on file.     Social History     Socioeconomic History    Marital status: UNKNOWN     Spouse name: Not on file    Number of children: Not on file    Years of education: Not on file    Highest education level: Not on file   Occupational History    Not on file   Social Needs    Financial resource strain: Not on file    Food insecurity     Worry: Not on file     Inability: Not on file    Transportation needs     Medical: Not on file     Non-medical: Not on file   Tobacco Use    Smoking status: Current Every Day Smoker     Packs/day: 1.00    Smokeless tobacco: Never Used   Substance and Sexual Activity    Alcohol use: Never     Frequency: Never    Drug use: Yes     Types: Marijuana    Sexual activity: Not on file   Lifestyle    Physical activity     Days per week: Not on file     Minutes per session: Not on file    Stress: Not on file   Relationships    Social connections Talks on phone: Not on file     Gets together: Not on file     Attends Islam service: Not on file     Active member of club or organization: Not on file     Attends meetings of clubs or organizations: Not on file     Relationship status: Not on file    Intimate partner violence     Fear of current or ex partner: Not on file     Emotionally abused: Not on file     Physically abused: Not on file     Forced sexual activity: Not on file   Other Topics Concern    Not on file   Social History Narrative    Not on file       Admission on 03/18/2020, Discharged on 03/20/2020   Component Date Value Ref Range Status    Glucose (POC) 03/18/2020 168* 65 - 100 mg/dL Final    Comment: (NOTE)  The Accu-Chek Inform II glucometer is not FDA cleared for critically   ill patient use. A study was performed validating the equivalence of   glucometer and clinical laboratory results on this patient   population. Despite the study, use of glucometers with capillary   specimens from critically ill patients, regardless of their location,   makes the test high complexity and requires the performing individual   to comply with CLIA requirements more stringent than those for waived   testing in the hospital setting. Critical thinking skills are   necessary to determine a potentially critically ill patients status   prior to using a glucometer.  Performed by 03/18/2020 Delorise Delay   Final    Glucose (POC) 03/18/2020 150* 65 - 100 mg/dL Final    Comment: (NOTE)  The Accu-Chek Inform II glucometer is not FDA cleared for critically   ill patient use. A study was performed validating the equivalence of   glucometer and clinical laboratory results on this patient   population.  Despite the study, use of glucometers with capillary   specimens from critically ill patients, regardless of their location,   makes the test high complexity and requires the performing individual   to comply with CLIA requirements more stringent than those for waived   testing in the hospital setting. Critical thinking skills are   necessary to determine a potentially critically ill patients status   prior to using a glucometer.  Performed by 03/18/2020 Estrellita Montague (PCT)   Final    WBC 03/19/2020 5.8  3.6 - 11.0 K/uL Final    RBC 03/19/2020 4.02  3.80 - 5.20 M/uL Final    HGB 03/19/2020 10.3* 11.5 - 16.0 g/dL Final    HCT 03/19/2020 33.7* 35.0 - 47.0 % Final    MCV 03/19/2020 83.8  80.0 - 99.0 FL Final    MCH 03/19/2020 25.6* 26.0 - 34.0 PG Final    MCHC 03/19/2020 30.6  30.0 - 36.5 g/dL Final    RDW 03/19/2020 17.6* 11.5 - 14.5 % Final    PLATELET 60/00/8969 918* 150 - 400 K/uL Final    MPV 03/19/2020 9.1  8.9 - 12.9 FL Final    NRBC 03/19/2020 0.0  0  WBC Final    ABSOLUTE NRBC 03/19/2020 0.00  0.00 - 0.01 K/uL Final    NEUTROPHILS 03/19/2020 46  32 - 75 % Final    LYMPHOCYTES 03/19/2020 46  12 - 49 % Final    MONOCYTES 03/19/2020 5  5 - 13 % Final    EOSINOPHILS 03/19/2020 2  0 - 7 % Final    BASOPHILS 03/19/2020 0  0 - 1 % Final    IMMATURE GRANULOCYTES 03/19/2020 1* 0.0 - 0.5 % Final    ABS. NEUTROPHILS 03/19/2020 2.7  1.8 - 8.0 K/UL Final    ABS. LYMPHOCYTES 03/19/2020 2.7  0.8 - 3.5 K/UL Final    ABS. MONOCYTES 03/19/2020 0.3  0.0 - 1.0 K/UL Final    ABS. EOSINOPHILS 03/19/2020 0.1  0.0 - 0.4 K/UL Final    ABS. BASOPHILS 03/19/2020 0.0  0.0 - 0.1 K/UL Final    ABS. IMM.  GRANS. 03/19/2020 0.0  0.00 - 0.04 K/UL Final    DF 03/19/2020 AUTOMATED    Final    Sodium 03/19/2020 138  136 - 145 mmol/L Final    Potassium 03/19/2020 4.2  3.5 - 5.1 mmol/L Final    Chloride 03/19/2020 104  97 - 108 mmol/L Final    CO2 03/19/2020 27  21 - 32 mmol/L Final    Anion gap 03/19/2020 7  5 - 15 mmol/L Final    Glucose 03/19/2020 118* 65 - 100 mg/dL Final    BUN 03/19/2020 10  6 - 20 MG/DL Final    Creatinine 03/19/2020 0.67  0.55 - 1.02 MG/DL Final    BUN/Creatinine ratio 03/19/2020 15  12 - 20   Final    GFR est AA 03/19/2020 >60 >60 ml/min/1.73m2 Final    GFR est non-AA 03/19/2020 >60  >60 ml/min/1.73m2 Final    Comment: Estimated GFR is calculated using the IDMS-traceable Modification of Diet in Renal Disease (MDRD) Study equation, reported for both  Americans (GFRAA) and non- Americans (GFRNA), and normalized to 1.73m2 body surface area. The physician must decide which value applies to the patient. The MDRD study equation should only be used in individuals age 25 or older. It has not been validated for the following: pregnant women, patients with serious comorbid conditions, or on certain medications, or persons with extremes of body size, muscle mass, or nutritional status.  Calcium 03/19/2020 8.9  8.5 - 10.1 MG/DL Final    Magnesium 03/19/2020 1.7  1.6 - 2.4 mg/dL Final    Phosphorus 03/19/2020 4.3  2.6 - 4.7 MG/DL Final    Iron 03/19/2020 69  35 - 150 ug/dL Final    TIBC 03/19/2020 233* 250 - 450 ug/dL Final    Iron % saturation 03/19/2020 30  20 - 50 % Final    Vitamin B12 03/19/2020 661  193 - 986 pg/mL Final    Folate 03/19/2020 8.8  5.0 - 21.0 ng/mL Final    Reticulocyte count 03/19/2020 3.2* 0.7 - 2.1 % Final    Absolute Retic Cnt. 03/19/2020 0.1294* 0.0164 - 0.0776 M/ul Final    Glucose (POC) 03/19/2020 150* 65 - 100 mg/dL Final    Comment: (NOTE)  The Accu-Chek Inform II glucometer is not FDA cleared for critically   ill patient use. A study was performed validating the equivalence of   glucometer and clinical laboratory results on this patient   population. Despite the study, use of glucometers with capillary   specimens from critically ill patients, regardless of their location,   makes the test high complexity and requires the performing individual   to comply with CLIA requirements more stringent than those for waived   testing in the hospital setting. Critical thinking skills are   necessary to determine a potentially critically ill patients status   prior to using a glucometer.       Performed by 03/19/2020 Yulissa Ross   Final    Glucose (POC) 03/19/2020 160* 65 - 100 mg/dL Final    Comment: (NOTE)  The Accu-Chek Inform II glucometer is not FDA cleared for critically   ill patient use. A study was performed validating the equivalence of   glucometer and clinical laboratory results on this patient   population. Despite the study, use of glucometers with capillary   specimens from critically ill patients, regardless of their location,   makes the test high complexity and requires the performing individual   to comply with CLIA requirements more stringent than those for waived   testing in the hospital setting. Critical thinking skills are   necessary to determine a potentially critically ill patients status   prior to using a glucometer.  Performed by 03/19/2020 Yulissa Ross   Final    Glucose (POC) 03/19/2020 149* 65 - 100 mg/dL Final    Comment: (NOTE)  The Accu-Chek Inform II glucometer is not FDA cleared for critically   ill patient use. A study was performed validating the equivalence of   glucometer and clinical laboratory results on this patient   population. Despite the study, use of glucometers with capillary   specimens from critically ill patients, regardless of their location,   makes the test high complexity and requires the performing individual   to comply with CLIA requirements more stringent than those for waived   testing in the hospital setting. Critical thinking skills are   necessary to determine a potentially critically ill patients status   prior to using a glucometer.  Performed by 03/19/2020 BRIA LOBO (PCT)   Final    Glucose (POC) 03/19/2020 145* 65 - 100 mg/dL Final    Comment: Will repeat test per nursing protocol  (NOTE)  The Accu-Chek Inform II glucometer is not FDA cleared for critically   ill patient use. A study was performed validating the equivalence of   glucometer and clinical laboratory results on this patient   population.  Despite the study, use of glucometers with capillary   specimens from critically ill patients, regardless of their location,   makes the test high complexity and requires the performing individual   to comply with CLIA requirements more stringent than those for waived   testing in the hospital setting. Critical thinking skills are   necessary to determine a potentially critically ill patients status   prior to using a glucometer.  Performed by 03/19/2020 Rosie Gosselin   Final    Glucose (POC) 03/20/2020 121* 65 - 100 mg/dL Final    Comment: (NOTE)  The Accu-Chek Inform II glucometer is not FDA cleared for critically   ill patient use. A study was performed validating the equivalence of   glucometer and clinical laboratory results on this patient   population. Despite the study, use of glucometers with capillary   specimens from critically ill patients, regardless of their location,   makes the test high complexity and requires the performing individual   to comply with CLIA requirements more stringent than those for waived   testing in the hospital setting. Critical thinking skills are   necessary to determine a potentially critically ill patients status   prior to using a glucometer.  Performed by 03/20/2020 Maria G Mendosa   Final    Glucose (POC) 03/20/2020 160* 65 - 100 mg/dL Final    Comment: (NOTE)  The Accu-Chek Inform II glucometer is not FDA cleared for critically   ill patient use. A study was performed validating the equivalence of   glucometer and clinical laboratory results on this patient   population. Despite the study, use of glucometers with capillary   specimens from critically ill patients, regardless of their location,   makes the test high complexity and requires the performing individual   to comply with CLIA requirements more stringent than those for waived   testing in the hospital setting.  Critical thinking skills are   necessary to determine a potentially critically ill patients status prior to using a glucometer.  Performed by 03/20/2020 Farrukhritu Farahiff   Final   No results displayed because visit has over 200 results. Review of Systems   Constitutional: Negative for malaise/fatigue. HENT: Negative for congestion. Eyes: Negative for blurred vision. Respiratory: Negative for shortness of breath. Cardiovascular: Negative for chest pain and leg swelling. Gastrointestinal: Negative for constipation, diarrhea and heartburn. Genitourinary: Negative for dysuria, frequency and urgency. Musculoskeletal: Negative for joint pain and myalgias. Neurological: Negative for dizziness and headaches. Psychiatric/Behavioral: Positive for depression. Negative for substance abuse. The patient is nervous/anxious. The patient does not have insomnia. There were no vitals taken for this visit. Physical Exam  Constitutional:       General: She is not in acute distress. Appearance: She is well-developed. She is not diaphoretic. HENT:      Head: Normocephalic and atraumatic. Nose: No congestion. Eyes:      General:         Right eye: No discharge. Left eye: No discharge. Conjunctiva/sclera: Conjunctivae normal.   Pulmonary:      Effort: Pulmonary effort is normal. No respiratory distress. Neurological:      Mental Status: She is alert and oriented to person, place, and time. Psychiatric:         Behavior: Behavior normal.         Thought Content: Thought content normal.       ASSESSMENT and PLAN    ICD-10-CM ICD-9-CM    1. Controlled type 2 diabetes mellitus without complication, with long-term current use of insulin (HCC) E11.9 250.00 metFORMIN (GLUCOPHAGE) 500 mg tablet sent to pharmacy    Metformin 500 mg prescribed. Potential side effects were discussed. Pt should take 1 tab BID.    Z79.4 V58.67    2. Anxiety and depression F41.9 300.00 QUEtiapine (SEROquel) 50 mg tablet sent to pharmacy    Seroquel 50 mg prescribed.  Potential side effects were discussed. Pt should take 1 tab BID. F32.9 311    3. [facility] M35.2 136.1 Asked pt to schedule a follow-up appointment in 2 weeks. Total Time: minutes: 24 minutes. This plan was reviewed with the patient and patient agrees. All questions were answered. This scribe documentation was reviewed by me and accurately reflects the examination and decisions made by me. This note will not be viewable in [address]

## 2021-06-15 ENCOUNTER — HOSPITAL ENCOUNTER (EMERGENCY)
Age: 41
Discharge: HOME | End: 2021-06-15
Attending: EMERGENCY MEDICINE
Payer: MEDICAID

## 2021-06-15 ENCOUNTER — APPOINTMENT (OUTPATIENT)
Dept: VASCULAR SURGERY | Age: 41
End: 2021-06-15
Attending: EMERGENCY MEDICINE
Payer: MEDICAID

## 2021-06-15 VITALS
HEART RATE: 81 BPM | SYSTOLIC BLOOD PRESSURE: 116 MMHG | DIASTOLIC BLOOD PRESSURE: 82 MMHG | TEMPERATURE: 98.1 F | WEIGHT: 208 LBS | OXYGEN SATURATION: 99 % | BODY MASS INDEX: 33.43 KG/M2 | RESPIRATION RATE: 16 BRPM | HEIGHT: 66 IN

## 2021-06-15 DIAGNOSIS — M79.602 PAIN OF LEFT UPPER EXTREMITY: Primary | ICD-10-CM

## 2021-06-15 DIAGNOSIS — R73.9 HYPERGLYCEMIA: ICD-10-CM

## 2021-06-15 DIAGNOSIS — R07.89 CHEST WALL PAIN: ICD-10-CM

## 2021-06-15 DIAGNOSIS — R59.0 CERVICAL LYMPHADENOPATHY: ICD-10-CM

## 2021-06-15 LAB
ALBUMIN SERPL-MCNC: 3.2 G/DL (ref 3.5–5)
ALBUMIN/GLOB SERPL: 0.6 {RATIO} (ref 1.1–2.2)
ALP SERPL-CCNC: 113 U/L (ref 45–117)
ALT SERPL-CCNC: 19 U/L (ref 12–78)
ANION GAP SERPL CALC-SCNC: 8 MMOL/L (ref 5–15)
AST SERPL-CCNC: 38 U/L (ref 15–37)
BASOPHILS # BLD: 0 K/UL (ref 0–0.1)
BASOPHILS NFR BLD: 0 % (ref 0–1)
BILIRUB SERPL-MCNC: 0.8 MG/DL (ref 0.2–1)
BUN SERPL-MCNC: 7 MG/DL (ref 6–20)
BUN/CREAT SERPL: 10 (ref 12–20)
CALCIUM SERPL-MCNC: 9.1 MG/DL (ref 8.5–10.1)
CHLORIDE SERPL-SCNC: 99 MMOL/L (ref 97–108)
CLUE CELLS VAG QL WET PREP: NORMAL
CO2 SERPL-SCNC: 26 MMOL/L (ref 21–32)
CREAT SERPL-MCNC: 0.7 MG/DL (ref 0.55–1.02)
DIFFERENTIAL METHOD BLD: ABNORMAL
EOSINOPHIL # BLD: 0.1 K/UL (ref 0–0.4)
EOSINOPHIL NFR BLD: 1 % (ref 0–7)
ERYTHROCYTE [DISTWIDTH] IN BLOOD BY AUTOMATED COUNT: 15.9 % (ref 11.5–14.5)
GLOBULIN SER CALC-MCNC: 5 G/DL (ref 2–4)
GLUCOSE SERPL-MCNC: 252 MG/DL (ref 65–100)
HCT VFR BLD AUTO: 40.9 % (ref 35–47)
HGB BLD-MCNC: 13.5 G/DL (ref 11.5–16)
IMM GRANULOCYTES # BLD AUTO: 0.1 K/UL (ref 0–0.04)
IMM GRANULOCYTES NFR BLD AUTO: 0 % (ref 0–0.5)
KOH PREP SPEC: NORMAL
LYMPHOCYTES # BLD: 2.7 K/UL (ref 0.8–3.5)
LYMPHOCYTES NFR BLD: 24 % (ref 12–49)
MCH RBC QN AUTO: 28 PG (ref 26–34)
MCHC RBC AUTO-ENTMCNC: 33 G/DL (ref 30–36.5)
MCV RBC AUTO: 84.7 FL (ref 80–99)
MONOCYTES # BLD: 0.4 K/UL (ref 0–1)
MONOCYTES NFR BLD: 4 % (ref 5–13)
NEUTS SEG # BLD: 7.9 K/UL (ref 1.8–8)
NEUTS SEG NFR BLD: 71 % (ref 32–75)
NRBC # BLD: 0 K/UL (ref 0–0.01)
NRBC BLD-RTO: 0 PER 100 WBC
PLATELET # BLD AUTO: 336 K/UL (ref 150–400)
PMV BLD AUTO: 9.6 FL (ref 8.9–12.9)
POTASSIUM SERPL-SCNC: 4.8 MMOL/L (ref 3.5–5.1)
PROT SERPL-MCNC: 8.2 G/DL (ref 6.4–8.2)
RBC # BLD AUTO: 4.83 M/UL (ref 3.8–5.2)
SERVICE CMNT-IMP: NORMAL
SODIUM SERPL-SCNC: 133 MMOL/L (ref 136–145)
T VAGINALIS VAG QL WET PREP: NORMAL
TROPONIN I SERPL-MCNC: <0.05 NG/ML
WBC # BLD AUTO: 11.2 K/UL (ref 3.6–11)

## 2021-06-15 PROCEDURE — 84484 ASSAY OF TROPONIN QUANT: CPT

## 2021-06-15 PROCEDURE — 93971 EXTREMITY STUDY: CPT

## 2021-06-15 PROCEDURE — 74011250636 HC RX REV CODE- 250/636: Performed by: EMERGENCY MEDICINE

## 2021-06-15 PROCEDURE — 36415 COLL VENOUS BLD VENIPUNCTURE: CPT

## 2021-06-15 PROCEDURE — 80053 COMPREHEN METABOLIC PANEL: CPT

## 2021-06-15 PROCEDURE — 85025 COMPLETE CBC W/AUTO DIFF WBC: CPT

## 2021-06-15 PROCEDURE — 93005 ELECTROCARDIOGRAM TRACING: CPT

## 2021-06-15 PROCEDURE — 96374 THER/PROPH/DIAG INJ IV PUSH: CPT

## 2021-06-15 PROCEDURE — 87210 SMEAR WET MOUNT SALINE/INK: CPT

## 2021-06-15 PROCEDURE — 99284 EMERGENCY DEPT VISIT MOD MDM: CPT

## 2021-06-15 RX ORDER — NAPROXEN 500 MG/1
500 TABLET ORAL
Qty: 20 TABLET | Refills: 0 | Status: SHIPPED | OUTPATIENT
Start: 2021-06-15

## 2021-06-15 RX ORDER — FLUCONAZOLE 150 MG/1
150 TABLET ORAL
Qty: 1 TABLET | Refills: 1 | Status: SHIPPED | OUTPATIENT
Start: 2021-06-15 | End: 2021-06-15

## 2021-06-15 RX ORDER — KETOROLAC TROMETHAMINE 30 MG/ML
30 INJECTION, SOLUTION INTRAMUSCULAR; INTRAVENOUS
Status: COMPLETED | OUTPATIENT
Start: 2021-06-15 | End: 2021-06-15

## 2021-06-15 RX ADMIN — KETOROLAC TROMETHAMINE 30 MG: 30 INJECTION, SOLUTION INTRAMUSCULAR; INTRAVENOUS at 13:32

## 2021-06-15 NOTE — ED PROVIDER NOTES
EMERGENCY DEPARTMENT HISTORY AND PHYSICAL EXAM      Date: 6/15/2021  Patient Name: Tennille Tapia    History of Presenting Illness     Chief Complaint   Patient presents with    Chest Pain    Arm Pain     History Provided By: Patient    HPI: Tennille Tapia, 36 y.o. female with past medical history significant for Behcet's disease, diabetes, and hypertension who presents via private vehicle to the ED with cc of left arm pain that started yesterday afternoon and left-sided chest pain that started this morning. Patient denies any trauma, injury, or heavy lifting. She also denies any repetitive movements. Her pain is described as a dull/aching pain that is worse with palpation and nothing makes the pain better. She tried taking ibuprofen last night with no improvement of symptoms. She denies any shortness of breath, lightheadedness, or dizziness. PMHx: Behcet's disease, diabetes, hypertension  Social Hx: Smokes 1 pack/day, denies alcohol use, occasional marijuana use    PCP: None    There are no other complaints, changes, or physical findings at this time. No current facility-administered medications on file prior to encounter. Current Outpatient Medications on File Prior to Encounter   Medication Sig Dispense Refill    [DISCONTINUED] senna-docusate (PERICOLACE) 8.6-50 mg per tablet Take 1 Tab by mouth daily as needed for Constipation. (Patient not taking: Reported on 6/15/2021) 30 Tab 0    [DISCONTINUED] acetaminophen (TYLENOL) 500 mg tablet Take 1 Tab by mouth every six (6) hours as needed for Pain. (Patient not taking: Reported on 6/15/2021) 30 Tab 0    [DISCONTINUED] pantoprazole (PROTONIX) 40 mg tablet Take 1 Tab by mouth Daily (before breakfast). (Patient not taking: Reported on 6/15/2021) 30 Tab 0    [DISCONTINUED] ALPRAZolam (XANAX) 0.5 mg tablet Take 1 Tab by mouth three (3) times daily as needed for Anxiety.  Max Daily Amount: 1.5 mg. (Patient not taking: Reported on 6/15/2021) 60 Tab 1 Past History     Past Medical History:  Past Medical History:   Diagnosis Date    [facility] ([facility] [address]     Diabetes ([facility] [address]     HTN (hypertension)      Past Surgical History:  History reviewed. No pertinent surgical history. Family History:  History reviewed. No pertinent family history. Social History:  Social History     Tobacco Use    Smoking status: Current Every Day Smoker     Packs/day: 1.00    Smokeless tobacco: Never Used   Substance Use Topics    Alcohol use: Never    Drug use: Yes     Types: Marijuana     Allergies:  No Known Allergies  Review of Systems   Review of Systems   Constitutional: Negative for chills and fever. HENT: Negative for congestion, rhinorrhea, sneezing and sore throat. Eyes: Negative for redness and visual disturbance. Respiratory: Negative for shortness of breath. Cardiovascular: Positive for chest pain. Negative for leg swelling. Gastrointestinal: Negative for abdominal pain, nausea and vomiting. Genitourinary: Negative for difficulty urinating and frequency. Musculoskeletal: Positive for arthralgias and myalgias. Negative for back pain and neck stiffness. Skin: Negative for rash. Neurological: Negative for dizziness, syncope, weakness and headaches. Hematological: Negative for adenopathy. All other systems reviewed and are negative. Physical Exam   Physical Exam  Vitals and nursing note reviewed. Constitutional:       Appearance: Normal appearance. She is well-developed. HENT:      Head: Normocephalic and atraumatic. Eyes:      Conjunctiva/sclera: Conjunctivae normal.   Cardiovascular:      Rate and Rhythm: Normal rate and regular rhythm. Pulses: Normal pulses. Heart sounds: Normal heart sounds, S1 normal and S2 normal. No murmur heard. Pulmonary:      Effort: Pulmonary effort is normal. No respiratory distress. Breath sounds: Normal breath sounds. No wheezing. Chest:      Chest wall: Tenderness present.  No deformity or crepitus. Abdominal:      General: Bowel sounds are normal. There is no distension. Palpations: Abdomen is soft. Tenderness: There is no abdominal tenderness. There is no rebound. Musculoskeletal:         General: Normal range of motion. Left upper arm: Tenderness present. No swelling, edema, deformity or bony tenderness. Cervical back: Full passive range of motion without pain, normal range of motion and neck supple. Skin:     General: Skin is warm and dry. Findings: Lesion present. No rash. Neurological:      Mental Status: She is alert and oriented to person, place, and time. Psychiatric:         Speech: Speech normal.         Behavior: Behavior normal.         Thought Content:  Thought content normal.         Judgment: Judgment normal.       Diagnostic Study Results   Labs -     Recent Results (from the past 12 hour(s))   EKG, 12 LEAD, INITIAL    Collection Time: 06/15/21 12:09 PM   Result Value Ref Range    Ventricular Rate 93 BPM    Atrial Rate 93 BPM    P-R Interval 170 ms    QRS Duration 90 ms    Q-T Interval 342 ms    QTC Calculation (Bezet) 425 ms    Calculated P Axis 66 degrees    Calculated R Axis -10 degrees    Calculated T Axis 54 degrees    Diagnosis       Normal sinus rhythm  Anteroseptal infarct , age undetermined  Abnormal ECG  No previous ECGs available     TROPONIN I    Collection Time: 06/15/21  1:29 PM   Result Value Ref Range    Troponin-I, Qt. <0.05 <0.05 ng/mL   CBC WITH AUTOMATED DIFF    Collection Time: 06/15/21  1:29 PM   Result Value Ref Range    WBC 11.2 (H) 3.6 - 11.0 K/uL    RBC 4.83 3.80 - 5.20 M/uL    HGB 13.5 11.5 - 16.0 g/dL    HCT 40.9 35.0 - 47.0 %    MCV 84.7 80.0 - 99.0 FL    MCH 28.0 26.0 - 34.0 PG    MCHC 33.0 30.0 - 36.5 g/dL    RDW 15.9 (H) 11.5 - 14.5 %    PLATELET 287 716 - 209 K/uL    MPV 9.6 8.9 - 12.9 FL    NRBC 0.0 0  WBC    ABSOLUTE NRBC 0.00 0.00 - 0.01 K/uL    NEUTROPHILS 71 32 - 75 %    LYMPHOCYTES 24 12 - 49 %    MONOCYTES 4 (L) 5 - 13 %    EOSINOPHILS 1 0 - 7 %    BASOPHILS 0 0 - 1 %    IMMATURE GRANULOCYTES 0 0.0 - 0.5 %    ABS. NEUTROPHILS 7.9 1.8 - 8.0 K/UL    ABS. LYMPHOCYTES 2.7 0.8 - 3.5 K/UL    ABS. MONOCYTES 0.4 0.0 - 1.0 K/UL    ABS. EOSINOPHILS 0.1 0.0 - 0.4 K/UL    ABS. BASOPHILS 0.0 0.0 - 0.1 K/UL    ABS. IMM. GRANS. 0.1 (H) 0.00 - 0.04 K/UL    DF AUTOMATED     METABOLIC PANEL, COMPREHENSIVE    Collection Time: 06/15/21  1:29 PM   Result Value Ref Range    Sodium 133 (L) 136 - 145 mmol/L    Potassium 4.8 3.5 - 5.1 mmol/L    Chloride 99 97 - 108 mmol/L    CO2 26 21 - 32 mmol/L    Anion gap 8 5 - 15 mmol/L    Glucose 252 (H) 65 - 100 mg/dL    BUN 7 6 - 20 MG/DL    Creatinine 0.70 0.55 - 1.02 MG/DL    BUN/Creatinine ratio 10 (L) 12 - 20      GFR est AA >60 >60 ml/min/1.73m2    GFR est non-AA >60 >60 ml/min/1.73m2    Calcium 9.1 8.5 - 10.1 MG/DL    Bilirubin, total 0.8 0.2 - 1.0 MG/DL    ALT (SGPT) 19 12 - 78 U/L    AST (SGOT) 38 (H) 15 - 37 U/L    Alk. phosphatase 113 45 - 117 U/L    Protein, total 8.2 6.4 - 8.2 g/dL    Albumin 3.2 (L) 3.5 - 5.0 g/dL    Globulin 5.0 (H) 2.0 - 4.0 g/dL    A-G Ratio 0.6 (L) 1.1 - 2.2     KOH, OTHER SOURCES    Collection Time: 06/15/21  2:23 PM    Specimen: Vaginal Specimen; Other   Result Value Ref Range    Special Requests: NO SPECIAL REQUESTS      KOH NO YEAST SEEN     WET PREP    Collection Time: 06/15/21  2:23 PM    Specimen: Miscellaneous sample   Result Value Ref Range    Clue cells CLUE CELLS ABSENT      Wet prep NO TRICHOMONAS SEEN         Radiologic Studies -   No orders to display     No results found. Medical Decision Making   I am the first provider for this patient. I reviewed the vital signs, available nursing notes, past medical history, past surgical history, family history and social history. Vital Signs-Reviewed the patient's vital signs.   Patient Vitals for the past 24 hrs:   Temp Pulse Resp BP SpO2   06/15/21 1501  81 16 116/82 99 %   06/15/21 1400  83 16 120/83 99 %   06/15/21 1158 98.1 °F (36.7 °C) 89 16 118/86 99 %     Pulse Oximetry Analysis - 99% on RA (normal)    Cardiac Monitor:   Rate: 89 bpm  Rhythm: Normal Sinus Rhythm     ED EKG interpretation: 12:09  Rhythm: normal sinus rhythm; and regular . Rate (approx.): 93; Axis: normal; P wave: normal; QRS interval: normal ; ST/T wave: non-specific changes; Other findings: abnormal ekg. This EKG was interpreted by Juancarlos Noble MD,ED Provider. Records Reviewed: Nursing Notes and Old Medical Records    Provider Notes (Medical Decision Making):   27-year-old female presents with left arm pain that started yesterday afternoon as well as left-sided chest pain that started this morning when she woke up. Differential includes DVT, musculoskeletal pain, vasculitis, overuse injury, costochondritis, ACS, and low suspicion for PE. Will check basic labs, treat with IV Toradol and obtain a venous duplex of the left upper extremity and reassess. ED Course:   Initial assessment performed. The patients presenting problems have been discussed, and they are in agreement with the care plan formulated and outlined with them. I have encouraged them to ask questions as they arise throughout their visit. Progress Note  2:15 PM  I have re-evaluated pt and she states that her pain has improved after the IV Toradol. She is also asking if we can swab her for a yeast infection. She also has an ulcer under her abdominal pannus and 3 small pustules with surrounding erythema on her suprapubic abdomen. She states she just finished Flagyl for bacterial vaginosis 3 days ago and would like to be tested for both yeast and BV. She declines antibiotics for her skin infection and agrees to keep the wound clean and dry and watch for worsening symptoms. If they worsen or do not improve, she agrees to return to the emergency department for further management.     Progress Note  2:26 PM  I have been informed by the vascular tech that she does not have a DVT but does have prominent lymph nodes in the left axilla as well as the left neck that are prominent and may be compressing on her vasculature. She does not have an abnormal lymphocyte count and is awaiting a callback from [facility] to set up primary care and a specialist.  We will continue to monitor while awaiting her wet prep and KOH. Progress Note  3:04 PM  I have re-evaluated pt she has no yeast on her KOH swab. Her wet prep is negative as well. I have reviewed [facility] medical records and she left AMA from the ED this morning. Will discharge with a prescription for naproxen and have her follow-up with [facility] at [facility]. She is awaiting a callback for an appointment. We will also prescribe Diflucan for her possible yeast infection. Progress Note:   Updated pt on all returned results and findings. Discussed the importance of proper follow up as referred below along with return precautions. Pt in agreement with the care plan and expresses agreement with and understanding of all items discussed. Disposition:  Discharge Note:  The pt is ready for discharge. The pt's signs, symptoms, diagnosis, and discharge instructions have been discussed and pt has conveyed their understanding. The pt is to follow up as recommended or return to ER should their symptoms worsen. Plan has been discussed and pt is in agreement. PLAN:  1. Current Discharge Medication List      START taking these medications    Details   naproxen (NAPROSYN) 500 mg tablet Take 1 Tablet by mouth every twelve (12) hours as needed for Pain. Qty: 20 Tablet, Refills: 0  Start date: 6/15/2021      fluconazole (Diflucan) 150 mg tablet Take 1 Tablet by mouth now for 1 dose.  Repeat in 5 days if no improvement  Qty: 1 Tablet, Refills: 1  Start date: 6/15/2021, End date: 6/15/2021         STOP taking these medications       senna-docusate (PERICOLACE) 8.6-50 mg per tablet Comments:   Reason for Stopping: acetaminophen (TYLENOL) 500 mg tablet Comments:   Reason for Stopping:         pantoprazole (PROTONIX) 40 mg tablet Comments:   Reason for Stopping:         ALPRAZolam (XANAX) 0.5 mg tablet Comments:   Reason for Stoppin.   Follow-up Information     Follow up With Specialties Details Why Contact Info    [facility] Department Of Internal Medicine  Call  to arrange primary care and a specialist [place] [address] [phone]    [facility] EMERGENCY DEPT Emergency Medicine  As needed, If symptoms worsen [place]  [phone]        Return to ED if worse     Diagnosis     Clinical Impression:   1. Pain of left upper extremity    2. Chest wall pain    3. Cervical lymphadenopathy            Please note that this dictation was completed with Dragon, computer voice recognition software. Quite often unanticipated grammatical, syntax, homophones, and other interpretive errors are inadvertently transcribed by the computer software. Please disregard these errors. Additionally, please excuse any errors that have escaped final proofreading.

## 2021-06-15 NOTE — ED NOTES
Pt also reporting she wants medication for \"yeast.\" Pt reports she has had white vaginal discharge for \"a while. \" Pt denies concern for STD's, MD aware, and verbal orders for wet prep and KOH given to this RN.

## 2021-06-15 NOTE — ED NOTES
Discharge instructions were given to the patient by Jose G Morillo RN. The patient left the Emergency Department ambulatory, alert and oriented and in no acute distress with 2 prescriptions. The patient was encouraged to call or return to the ED for worsening issues or problems and was encouraged to schedule a follow up appointment for continuing care. The patient verbalized understanding of discharge instructions and prescriptions, all questions were answered. The patient has no further concerns at this time.

## 2021-06-15 NOTE — ED TRIAGE NOTES
Reports chest pain starting this AM about 8:00 AM.  Also reports left arm pain starting last night.   Went to [address] [place] [address] earlier but states she left prior to being seen

## 2021-06-15 NOTE — ED NOTES
Pt presents to ED ambulatory complaining of atraumatic left bicep and elbow pain that started yesterday and atraumatic left-sided chest pain x today. Pt denies SOB. Pt reports she took ibuprofen for pain without relief. Pt also reporting skin problems to abdomen where her pannus rest against her pelvis. Pt is alert and oriented x 4, RR even and unlabored. Assessment completed and pt updated on plan of care. Call bell in reach. Emergency Department Nursing Plan of Care       The Nursing Plan of Care is developed from the Nursing assessment and Emergency Department Attending provider initial evaluation. The plan of care may be reviewed in the ED Provider note.     The Plan of Care was developed with the following considerations:   Patient / Family readiness to learn indicated by:verbalized understanding  Persons(s) to be included in education: patient  Barriers to Learning/Limitations:No    Signed     Nancy Aguillon    6/15/2021   1:06 PM

## 2021-06-16 LAB
ATRIAL RATE: 93 BPM
CALCULATED P AXIS, ECG09: 66 DEGREES
CALCULATED R AXIS, ECG10: -10 DEGREES
CALCULATED T AXIS, ECG11: 54 DEGREES
DIAGNOSIS, 93000: NORMAL
P-R INTERVAL, ECG05: 170 MS
Q-T INTERVAL, ECG07: 342 MS
QRS DURATION, ECG06: 90 MS
QTC CALCULATION (BEZET), ECG08: 425 MS
VENTRICULAR RATE, ECG03: 93 BPM

## 2021-06-16 PROCEDURE — 93971 EXTREMITY STUDY: CPT | Performed by: INTERNAL MEDICINE

## 2022-03-19 PROBLEM — M35.2 BEHCET'S DISEASE (HCC): Status: ACTIVE | Noted: 2020-05-06

## 2022-03-20 PROBLEM — K61.0 PERIANAL CELLULITIS: Status: ACTIVE | Noted: 2020-03-12

## 2022-03-20 PROBLEM — R73.9 HYPERGLYCEMIA: Status: ACTIVE | Noted: 2020-03-12

## 2023-05-24 RX ORDER — NAPROXEN 500 MG/1
500 TABLET ORAL
COMMUNITY
Start: 2021-06-15

## 2025-03-14 ENCOUNTER — TELEPHONE (OUTPATIENT)
Age: 45
End: 2025-03-14

## 2025-03-14 NOTE — TELEPHONE ENCOUNTER
Referral received to schedule a New Patient appointment for History of CVA with Residual Deficit. LVM to call and schedule.     If the patient calls back please schedule for the next available.

## 2025-04-02 NOTE — TELEPHONE ENCOUNTER
Reached back out to the patient to schedule a New Patient appointment for History of CVA. Patient scheduled for Sept. 29th with Karma